# Patient Record
Sex: FEMALE | Race: AMERICAN INDIAN OR ALASKA NATIVE | NOT HISPANIC OR LATINO | Employment: OTHER | URBAN - METROPOLITAN AREA
[De-identification: names, ages, dates, MRNs, and addresses within clinical notes are randomized per-mention and may not be internally consistent; named-entity substitution may affect disease eponyms.]

---

## 2017-01-03 ENCOUNTER — OFFICE VISIT (OUTPATIENT)
Dept: NEUROLOGY | Facility: CLINIC | Age: 62
End: 2017-01-03

## 2017-01-03 VITALS — SYSTOLIC BLOOD PRESSURE: 151 MMHG | DIASTOLIC BLOOD PRESSURE: 70 MMHG | HEART RATE: 78 BPM

## 2017-01-03 DIAGNOSIS — G40.219 PARTIAL SYMPTOMATIC EPILEPSY WITH COMPLEX PARTIAL SEIZURES, INTRACTABLE, WITHOUT STATUS EPILEPTICUS (H): ICD-10-CM

## 2017-01-03 DIAGNOSIS — G40.219 LOCALIZATION-RELATED (FOCAL) (PARTIAL) SYMPTOMATIC EPILEPSY AND EPILEPTIC SYNDROMES WITH COMPLEX PARTIAL SEIZURES, INTRACTABLE, WITHOUT STATUS EPILEPTICUS (H): Primary | ICD-10-CM

## 2017-01-03 RX ORDER — CARBAMAZEPINE 200 MG/1
CAPSULE, EXTENDED RELEASE ORAL
Qty: 120 CAPSULE | Refills: 11 | Status: SHIPPED | OUTPATIENT
Start: 2017-01-03 | End: 2017-01-06

## 2017-01-03 NOTE — PROGRESS NOTES
[facility] Epilepsy [facility] Progress Note      Patient:  Zahra Benjamin  :  1955   Age:  61 year old   Today's Office Visit:  1/3/2017    Epilepsy Data:    Seizure Record  Current Visit Date: 17  Previous Visit Date: 11/10/16  Months since last visit: 1.77  Seizure Type 1: Simple partial onset followed by impairment of consciousness  Description of Sz Type 1: stare with unilat automatims and stiffening    History of Present Illness:     Unfortunately family cannot tell me how often seizures are occurring. Patient does not know when seizures occur either.  Ms Benjamin has multiple caregivers and they often change. Many are not familiar with epilepsy.  Both sisters see seizures on occasion. They describe repetitive verbalizations, staring. They confirm that she is amnestic during seizure.  Seizure duration one to two minutes. No convulsions. Both sisters agree that seizures more frequent than in the past.    Current Outpatient Prescriptions   Medication Sig Dispense Refill     ROSUVASTATIN CALCIUM PO Take 10 mg by mouth daily       VIMPAT 100 MG TABS tablet TAKE 1 TABLET (100 MG) BY MOUTH EVERY MORNING & TAKE 1 TABLET (100 MG) AT 6PM (ALSO TAKE 150MG TABLET AT BEDTIME ) 60 tablet 5     VIMPAT 150 MG TABS tablet TAKE 1 TABLET (VIMPAT 150 MG) BY MOUTH EVERYDAY AT BEDTIME (TAKE VIMPAT 100MG EVERY MORNING AND EVERY NIGHT AT 6 PM) 30 tablet 0     PHENobarbital (LUMINAL) 64.8 MG TABS Take 1 tablet by mouth at bedtime 30 tablet 3     APAP-CODEINE & DIET MANAGE NY PO Every 8 hours PRN       Ferrous Sulfate Dried (SLOW IRON PO) Take 50 mg by mouth daily       acetaminophen (TYLENOL) 500 MG tablet Take 2 tablets by mouth every 8 hours. 100 tablet      multivitamin, therapeutic with minerals (THERA-VIT-M) TABS Take 1 tablet by mouth daily.       olanzapine (ZYPREXA) 2.5 MG tablet Take 0.5 tablets by mouth 2 times daily. 30 tablet 0     PANtoprazole (PROTONIX) 40 MG enteric coated tablet Take 1 tablet by mouth 2 times daily.  60 tablet 0     ondansetron (ZOFRAN) 4 MG tablet Take 4-8 mg by mouth every 8 hours as needed.       magnesium oxide (MAG-OX) 400 MG tablet Take 400 mg by mouth At Bedtime.       calcium carbonate 750 MG CHEW Take 1,500 mg by mouth daily.       docusate sodium (COLACE) 100 MG capsule Take 100 mg by mouth 2 times daily.       Cyanocobalamin (VITAMIN B-12) 2500 MCG tablet Place 2,500 mcg under the tongue daily.       STATIN NOT PRESCRIBED, INTENTIONAL, by Other route continuous prn.  0     CALCIUM 600 + D 600-200 MG-UNIT OR TABS 2 times daily  3     ORDER FOR DME knee 1 0        Perceived AED Side Effects:  No    Medication Notes:  No side effects. Sisters again confirm she is less depressed off phenobarbital.      AED Medication Compliance:  compliant all of the time  Using a pill box:  Yes    Review of Systems:  Lethargy / Tiredness:  No  Nausea / Vomiting:  No  Double Vision:  No  Sleepiness:  No  Depression:  No  Slowed Cognitive Function:  Yes  Memory Problems:  Yes  Poor Balance:  No  Dizziness:  No  Appetite Changes:  No  Blurred Vision:  No  Decreased Libido:  na  Sleep Changes:  No  Behavioral Changes:  No  Skin: negative  Respiratory: No shortness of breath, dyspnea on exertion, cough, or hemoptysis and No shortness of breath  Cardiovascular: negative  Have you experienced a traumatic fall since your last visit: NO; wheel chair bound.  Are these falls related to your seizures:  NO      Other Issues:  No new medical issues.  Is patient safe to drive:  No    Woman Care:   Patient is:  Postmenopausal.    Exam:    /70 mmHg  Pulse 78  Breastfeeding? No     Wt Readings from Last 5 Encounters:   12/03/11 147 lb 0.2 oz (66.683 kg)   10/08/09 159 lb (72.122 kg)   10/17/06 135 lb (61.236 kg)   08/29/06 135 lb (61.236 kg)   06/22/06 135 lb (61.236 kg)     Little irritabilitiy; does not appear overtly depressed. Overall more cooperative today. Language fluent, follows commands, appropriate. In wheel chair with  bilateral leg amputations so cannot evaluate gait.   Visual fields full. Extraocular movements intact without nystagmus. Smile symmetrical. Facial sensation equal. Tongue midline and strong. No drift, pronation, or tremor. Reflexes difficult to obtain in UE, AKA left, BKA right. Finger finger nose is done well.    Heart exam without murmur, RRR. I do not hear carotid bruit. She has not been weighed since multiple amputations but is estimated to weigh approx 150 pounds today.    Ambulatory EEG x 48 hours reviewed. Left temporal slowing. Left temporal epileptiform activity. I cannot find results of imaging in EMR tho she reports she had CNS imaging in the past. Results not immediately available.    Results for MARIAM GUTIERREZ (MRN [id number]) as of 1/3/2017 17:51   Ref. Range 11/7/2016 15:03   Lacosamide Latest Ref Range: >15.0 mcg/mL 10.0   Phenobarbital Free Level Latest Ref Range: 7.5-20 mcg/mL 8.8   Phenobarbital Level Latest Ref Range: 10-30 mcg/mL 14       Assessment and Plan:   1) Partial seizures, left temporal onset. As we had suspected seizures are occurring daily. Appears to have had better seizure control in the past on PHT PB CBZ.   Reduction of PB improved depression and increased responsiveness so we do not want to increase this medication. We had urged further escalation AED doses last two visit but she had refused.    2) Severe vitamin D deficiency; patient refuses MVI or Vit D supplementation.  3) History of significant peripheral vascular disease. Status post left CEA. No evidence of TIA.  4) Probably continues with at least dysthymia. Not major depression and not suicidal. Better than previously, probably because on less phenobarbital. Vitamin D deficiency may be playing a role.    DISCUSSION  We again discussed potential risks of poorly controlled partial epilepsy. She appeared to respond to results of more objective measures and is open to further AED adjustment. Given the reasonable lacosamide  levels she is open to other AEDs.  Review of chart indicates she did well on carbamazepine PHT PB and that only carbamazepine was therapeutic when taking 1000 mg per day. So adding carbamazepine and moving to eventually discontinue either lacosamide or PB is reasonable.  She clearly tolerated that medication well. She is open to this. We also discussed obtaining better seizure count which will be challenging given multiple caregivers who are not aware of her seizure appearance.    PLAN:  1) Same phenobarbital. Provided with regimen to gradually add carbamazepine ER pushing to 800 mg per day while reducing lacosamide to 250 mg per day.  2) We again suggested Vit D supplementation and patient again aggressively resisted.  3) Given seizure diary. Suggested family record one of her seizures on smart phone and use this to educate caregivers on her target spells.  4) Substituting CBZ, OXC, or LTG for PB could be considered if clinically indicated.  5) RTC 4 months.  6) Will try to track down imaging in old paper chart. If we are not making progress may need to reimage especially given persistent left temporal slowing and epileptiform activity.    Karthik Curtis

## 2017-01-03 NOTE — MR AVS SNAPSHOT
After Visit Summary   1/3/2017    Zahra Benjamin    MRN: [id number]           Patient Information     Date Of Birth          1955        Visit Information        Provider Department      1/3/2017 2:00 PM [facility]        Today's Diagnoses     Localization-related (focal) (partial) symptomatic epilepsy and epileptic syndromes with complex partial seizures, intractable, without status epilepticus (H)    -  1       Care Instructions      Times of Days  AM  PM  BED     Medication Tablet Size Number of Tablets/Capsules Total Daily Dosage    NOW vimpat 100 mg  1    1      350 mg               vimpat 150 mg          1                        week 1 to 2 carbamazepine 200 mg   0    0    1  200 mg    vimpat 100 mg  1    1    0      vimpat  150 mg  0    0    1  350 mg                     Week three  carbmazepine 200 mg 1  0 1  400mg   vimpat 100 mg 1  1 0     vimpat 150 mg 0  0 1  350 mg             Week four carbamazepine 200 mg 1  1 1  600 mg   vimpat 100mg 1  1 0     vimpat  150 mg 0  0 1  350 mg             Week five and after carbamazepine 200 mg 1  1 2  800 mg   vimpat 100 mg 1  0 0     vimpat 150 mg 0  0 1  250 mg                                   Keep phenobarbital the same. Start and increase carbamazepine (generic for tegretol).  Once reach 800 mg of carbamazepine reduce vimpat a little bit.        Carry this with you at all times.  CONTINUE TAKING YOUR OTHER MEDICATIONS AS PREVIOUSLY DIRECTED.      * * *Do not store medications in the bathroom.  Keep medications away from children!* * *           Follow-ups after your visit        Follow-up notes from your care team     Return in about 4 months (around 5/3/2017).      Your next 10 appointments already scheduled     May 09, 2017 11:00 AM   Return Visit with MD [facility] ([facility])    [address], [address]  [facility] [address] [phone]               Who to contact     Please call your clinic at [phone] to:    Ask questions about your health    Make or cancel appointments    Discuss your medicines    Learn about your test results    Speak to your doctor   If you have compliments or concerns about an experience at your clinic, or if you wish to file a complaint, please contact [facility] Patient Relations at [phone] or email us at [email or web address]         Additional Information About Your Visit        [employer] Information     Dely is an electronic gateway that provides easy, online access to your medical records. With Dely, you can request a clinic appointment, read your test results, renew a prescription or communicate with your care team.     To sign up for Dely visit the website at [email or web address]   You will be asked to enter the access code listed below, as well as some personal information. Please follow the directions to create your username and password.     Your access code is: [email or web address]  Expires: 2017 11:41 AM     Your access code will  in 90 days. If you need help or a new code, please contact your [facility] or call [phone] for assistance.        Care EveryWhere ID     This is your Care EveryWhere ID. This could be used by other organizations to access your [place] medical records  [id number]        Your Vitals Were     Pulse Breastfeeding?                78 No           Blood Pressure from Last 3 Encounters:   17 151/70   11/10/16 86/64   06/10/15 140/72    Weight from Last 3 Encounters:   11 147 lb 0.2 oz (66.683 kg)   10/08/09 159 lb (72.122 kg)   10/17/06 135 lb (61.236 kg)              Today, you had the following     No orders found for display         Today's Medication Changes          These changes are accurate as of: 1/3/17  3:05 PM.  If you have any questions, ask your nurse or doctor.                Start taking these medicines.        Dose/Directions    carBAMazepine 200 MG 12 hr capsule   Commonly known as:  CARBATROL   Used for:  Localization-related (focal) (partial) symptomatic epilepsy and epileptic syndromes with complex partial seizures, intractable, without status epilepticus (H)   Started by:  Karthik Curtis MD        Increase as instructed until taking one in AM, one in PM and two at bedtime (total 800 mg per day)   Quantity:  120 capsule   Refills:  11            Where to get your medicines      These medications were [place] [facility] [place], [place] - [address]  [address], [facility] [address]     Phone:  [phone]    - carBAMazepine 200 MG 12 hr capsule             Primary Care Provider Office Phone # Fax #    Babatunde Molina PA-C [phone]       [facility] [address]  [facility] [address]        Thank you!     Thank you for choosing [facility]  for your care. Our goal is always to provide you with excellent care. Hearing back from our patients is one way we can continue to improve our services. Please take a few minutes to complete the written survey that you may receive in the mail after your visit with us. Thank you!             Your Updated Medication List - Protect others around you: Learn how to safely use, store and throw away your medicines at www.disposemymeds.org.          This list is accurate as of: 1/3/17  3:05 PM.  Always use your most recent med list.                   Brand Name Dispense Instructions for use    acetaminophen 500 MG tablet    TYLENOL    100 tablet    Take 2 tablets by mouth every 8 hours.       APAP-CODEINE & DIET MANAGE VA PO      Every 8 hours PRN       CALCIUM 600 + D 600-200 MG-UNIT Tabs      2 times daily       calcium carbonate 750 MG Chew      Take 1,500 mg by mouth daily.       carBAMazepine 200 MG 12 hr capsule    CARBATROL    120 capsule    Increase as instructed  until taking one in AM, one in PM and two at bedtime (total 800 mg per day)       cyabnocobalamin 2500 MCG sublingual tablet    VITAMIN B-12     Place 2,500 mcg under the tongue daily.       docusate sodium 100 MG capsule    COLACE     Take 100 mg by mouth 2 times daily.       magnesium oxide 400 MG tablet    MAG-OX     Take 400 mg by mouth At Bedtime.       multivitamin, therapeutic with minerals Tabs tablet      Take 1 tablet by mouth daily.       OLANZapine 2.5 MG tablet    zyPREXA    30 tablet    Take 0.5 tablets by mouth 2 times daily.       * order for DME     1    knee       pantoprazole 40 MG EC tablet    PROTONIX    60 tablet    Take 1 tablet by mouth 2 times daily.       PHENobarbital 64.8 MG Tabs tablet    LUMINAL    30 tablet    Take 1 tablet by mouth at bedtime       ROSUVASTATIN CALCIUM PO      Take 10 mg by mouth daily       SLOW IRON PO      Take 50 mg by mouth daily       * STATIN NOT PRESCRIBED (INTENTIONAL)      by Other route continuous prn.       * VIMPAT 100 MG Tabs tablet   Generic drug:  Lacosamide     60 tablet    TAKE 1 TABLET (100 MG) BY MOUTH EVERY MORNING & TAKE 1 TABLET (100 MG) AT 6PM (ALSO TAKE 150MG TABLET AT BEDTIME )       * VIMPAT 150 MG Tabs tablet   Generic drug:  lacosamide     30 tablet    TAKE 1 TABLET (VIMPAT 150 MG) BY MOUTH EVERYDAY AT BEDTIME (TAKE VIMPAT 100MG EVERY MORNING AND EVERY NIGHT AT 6 PM)       ZOFRAN 4 MG tablet   Generic drug:  ondansetron      Take 4-8 mg by mouth every 8 hours as needed.       * Notice:  This list has 4 medication(s) that are the same as other medications prescribed for you. Read the directions carefully, and ask your doctor or other care provider to review them with you.

## 2017-01-03 NOTE — Clinical Note
1/3/2017       RE: Zahra Benjamin  : 1955   MRN: [id number]      Dear Colleague,    Thank you for referring your patient, Zahra Benjamin, to the [facility]. Please see a copy of my visit note below.    [facility]      Patient:  Zahra Benjamin  :  1955   Age:  61 year old   Today's Office Visit:  1/3/2017    Epilepsy Data:    Seizure Record  Current Visit Date: 17  Previous Visit Date: 11/10/16  Months since last visit: 1.77  Seizure Type 1: Simple partial onset followed by impairment of consciousness  Description of Sz Type 1: stare with unilat automatims and stiffening    History of Present Illness:     Unfortunately family cannot tell me how often seizures are occurring. Patient does not know when seizures occur either.  Ms Benjamin has multiple caregivers and they often change. Many are not familiar with epilepsy.  Both sisters see seizures on occasion. They describe repetitive verbalizations, staring. They confirm that she is amnestic during seizure.  Seizure duration one to two minutes. No convulsions. Both sisters agree that seizures more frequent than in the past.    Current Outpatient Prescriptions   Medication Sig Dispense Refill     ROSUVASTATIN CALCIUM PO Take 10 mg by mouth daily       VIMPAT 100 MG TABS tablet TAKE 1 TABLET (100 MG) BY MOUTH EVERY MORNING & TAKE 1 TABLET (100 MG) AT 6PM (ALSO TAKE 150MG TABLET AT BEDTIME ) 60 tablet 5     VIMPAT 150 MG TABS tablet TAKE 1 TABLET (VIMPAT 150 MG) BY MOUTH EVERYDAY AT BEDTIME (TAKE VIMPAT 100MG EVERY MORNING AND EVERY NIGHT AT 6 PM) 30 tablet 0     PHENobarbital (LUMINAL) 64.8 MG TABS Take 1 tablet by mouth at bedtime 30 tablet 3     APAP-CODEINE & DIET MANAGE [place] PO Every 8 hours PRN       Ferrous Sulfate Dried (SLOW IRON PO) Take 50 mg by mouth daily       acetaminophen (TYLENOL) 500 MG tablet Take 2 tablets by mouth every 8 hours. 100 tablet       multivitamin, therapeutic with minerals (THERA-VIT-M) TABS Take 1 tablet by mouth daily.       olanzapine (ZYPREXA) 2.5 MG tablet Take 0.5 tablets by mouth 2 times daily. 30 tablet 0     PANtoprazole (PROTONIX) 40 MG enteric coated tablet Take 1 tablet by mouth 2 times daily. 60 tablet 0     ondansetron (ZOFRAN) 4 MG tablet Take 4-8 mg by mouth every 8 hours as needed.       magnesium oxide (MAG-OX) 400 MG tablet Take 400 mg by mouth At Bedtime.       calcium carbonate 750 MG CHEW Take 1,500 mg by mouth daily.       docusate sodium (COLACE) 100 MG capsule Take 100 mg by mouth 2 times daily.       Cyanocobalamin (VITAMIN B-12) 2500 MCG tablet Place 2,500 mcg under the tongue daily.       STATIN NOT PRESCRIBED, INTENTIONAL, by Other route continuous prn.  0     CALCIUM 600 + D 600-200 MG-UNIT OR TABS 2 times daily  3     ORDER FOR DME knee 1 0        Perceived AED Side Effects:  No    Medication Notes:  No side effects. Sisters again confirm she is less depressed off phenobarbital.      AED Medication Compliance:  compliant all of the time  Using a pill box:  Yes    Review of Systems:  Lethargy / Tiredness:  No  Nausea / Vomiting:  No  Double Vision:  No  Sleepiness:  No  Depression:  No  Slowed Cognitive Function:  Yes  Memory Problems:  Yes  Poor Balance:  No  Dizziness:  No  Appetite Changes:  No  Blurred Vision:  No  Decreased Libido:  na  Sleep Changes:  No  Behavioral Changes:  No  Skin: negative  Respiratory: No shortness of breath, dyspnea on exertion, cough, or hemoptysis and No shortness of breath  Cardiovascular: negative  Have you experienced a traumatic fall since your last visit: NO; wheel chair bound.  Are these falls related to your seizures:  NO      Other Issues:  No new medical issues.  Is patient safe to drive:  No    Woman Care:   Patient is:  Postmenopausal.    Exam:    /70 mmHg  Pulse 78  Breastfeeding? No     Wt Readings from Last 5 Encounters:   12/03/11 147 lb 0.2 oz (66.683 kg)    10/08/09 159 lb (72.122 kg)   10/17/06 135 lb (61.236 kg)   08/29/06 135 lb (61.236 kg)   06/22/06 135 lb (61.236 kg)     Little irritabilitiy; does not appear overtly depressed. Overall more cooperative today. Language fluent, follows commands, appropriate. In wheel chair with bilateral leg amputations so cannot evaluate gait.   Visual fields full. Extraocular movements intact without nystagmus. Smile symmetrical. Facial sensation equal. Tongue midline and strong. No drift, pronation, or tremor. Reflexes difficult to obtain in UE, AKA left, BKA right. Finger finger nose is done well.    Heart exam without murmur, RRR. I do not hear carotid bruit. She has not been weighed since multiple amputations but is estimated to weigh approx 150 pounds today.    Ambulatory EEG x 48 hours reviewed. Left temporal slowing. Left temporal epileptiform activity. I cannot find results of imaging in EMR tho she reports she had CNS imaging in the past. Results not immediately available.    Results for MARIAM GUTIERREZ (MRN [id number]) as of 1/3/2017 17:51   Ref. Range 11/7/2016 15:03   Lacosamide Latest Ref Range: >15.0 mcg/mL 10.0   Phenobarbital Free Level Latest Ref Range: 7.5-20 mcg/mL 8.8   Phenobarbital Level Latest Ref Range: 10-30 mcg/mL 14       Assessment and Plan:   1) Partial seizures, left temporal onset. As we had suspected seizures are occurring daily. Appears to have had better seizure control in the past on PHT PB CBZ.   Reduction of PB improved depression and increased responsiveness so we do not want to increase this medication. We had urged further escalation AED doses last two visit but she had refused.    2) Severe vitamin D deficiency; patient refuses MVI or Vit D supplementation.  3) History of significant peripheral vascular disease. Status post left CEA. No evidence of TIA.  4) Probably continues with at least dysthymia. Not major depression and not suicidal. Better than previously, probably because on less  phenobarbital. Vitamin D deficiency may be playing a role.    DISCUSSION  We again discussed potential risks of poorly controlled partial epilepsy. She appeared to respond to results of more objective measures and is open to further AED adjustment. Given the reasonable lacosamide levels she is open to other AEDs.  Review of chart indicates she did well on carbamazepine PHT PB and that only carbamazepine was therapeutic when taking 1000 mg per day. So adding carbamazepine and moving to eventually discontinue either lacosamide or PB is reasonable.  She clearly tolerated that medication well. She is open to this. We also discussed obtaining better seizure count which will be challenging given multiple caregivers who are not aware of her seizure appearance.    PLAN:  1) Same phenobarbital. Provided with regimen to gradually add carbamazepine ER pushing to 800 mg per day while reducing lacosamide to 250 mg per day.  2) We again suggested Vit D supplementation and patient again aggressively resisted.  3) Given seizure diary. Suggested family record one of her seizures on smart phone and use this to educate caregivers on her target spells.  4) Substituting CBZ, OXC, or LTG for PB could be considered if clinically indicated.  5) RTC 4 months.  6) Will try to track down imaging in old paper chart. If we are not making progress may need to reimage especially given persistent left temporal slowing and epileptiform activity.      Again, thank you for allowing me to participate in the care of your patient.      Sincerely,    Karthik Curtis MD

## 2017-01-03 NOTE — PATIENT INSTRUCTIONS
Times of Days  AM  PM  BED     Medication Tablet Size Number of Tablets/Capsules Total Daily Dosage    NOW vimpat 100 mg  1    1      350 mg               vimpat 150 mg          1                        week 1 to 2 carbamazepine 200 mg   0    0    1  200 mg    vimpat 100 mg  1    1    0      vimpat  150 mg  0    0    1  350 mg                     Week three  carbmazepine 200 mg 1  0 1  400mg   vimpat 100 mg 1  1 0     vimpat 150 mg 0  0 1  350 mg             Week four carbamazepine 200 mg 1  1 1  600 mg   vimpat 100mg 1  1 0     vimpat  150 mg 0  0 1  350 mg             Week five and after carbamazepine 200 mg 1  1 2  800 mg   vimpat 100 mg 1  0 0     vimpat 150 mg 0  0 1  250 mg                                   Keep phenobarbital the same. Start and increase carbamazepine (generic for tegretol).  Once reach 800 mg of carbamazepine reduce vimpat a little bit.        Carry this with you at all times.  CONTINUE TAKING YOUR OTHER MEDICATIONS AS PREVIOUSLY DIRECTED.      * * *Do not store medications in the bathroom.  Keep medications away from children!* * *

## 2017-01-06 ENCOUNTER — TELEPHONE (OUTPATIENT)
Dept: NEUROLOGY | Facility: CLINIC | Age: 62
End: 2017-01-06

## 2017-01-06 DIAGNOSIS — G40.219 LOCALIZATION-RELATED (FOCAL) (PARTIAL) SYMPTOMATIC EPILEPSY AND EPILEPTIC SYNDROMES WITH COMPLEX PARTIAL SEIZURES, INTRACTABLE, WITHOUT STATUS EPILEPTICUS (H): Primary | ICD-10-CM

## 2017-01-06 RX ORDER — CARBAMAZEPINE 200 MG/1
TABLET, EXTENDED RELEASE ORAL
Qty: 120 TABLET | Refills: 11 | Status: SHIPPED | OUTPATIENT
Start: 2017-01-06 | End: 2017-11-09

## 2017-01-07 DIAGNOSIS — G40.219 LOCALIZATION-RELATED SYMPTOMATIC EPILEPSY AND EPILEPTIC SYNDROMES WITH COMPLEX PARTIAL SEIZURES, INTRACTABLE, WITHOUT STATUS EPILEPTICUS (H): Primary | ICD-10-CM

## 2017-01-09 RX ORDER — LACOSAMIDE 150 MG/1
TABLET ORAL
Qty: 30 TABLET | Refills: 5 | Status: SHIPPED | OUTPATIENT
Start: 2017-01-09 | End: 2017-07-05

## 2017-03-11 DIAGNOSIS — G40.109 SYMPTOMATIC LOCALIZATION-RELATED EPILEPSY (H): ICD-10-CM

## 2017-03-11 DIAGNOSIS — G40.209 LOCALIZATION-RELATED PARTIAL EPILEPSY WITH COMPLEX PARTIAL SEIZURES (H): ICD-10-CM

## 2017-03-13 RX ORDER — PHENOBARBITAL 64.8 MG/1
TABLET ORAL
Qty: 30 TABLET | Refills: 5 | Status: SHIPPED | OUTPATIENT
Start: 2017-03-13 | End: 2017-08-26

## 2017-03-13 RX ORDER — LACOSAMIDE 100 MG/1
TABLET ORAL
Qty: 30 TABLET | Refills: 3 | Status: SHIPPED | OUTPATIENT
Start: 2017-03-13 | End: 2017-07-05

## 2017-05-09 ENCOUNTER — TRANSFERRED RECORDS (OUTPATIENT)
Dept: HEALTH INFORMATION MANAGEMENT | Facility: CLINIC | Age: 62
End: 2017-05-09

## 2017-05-09 ENCOUNTER — OFFICE VISIT (OUTPATIENT)
Dept: NEUROLOGY | Facility: CLINIC | Age: 62
End: 2017-05-09

## 2017-05-09 VITALS — DIASTOLIC BLOOD PRESSURE: 61 MMHG | HEART RATE: 68 BPM | SYSTOLIC BLOOD PRESSURE: 135 MMHG

## 2017-05-09 DIAGNOSIS — G40.219 LOCALIZATION-RELATED SYMPTOMATIC EPILEPSY AND EPILEPTIC SYNDROMES WITH COMPLEX PARTIAL SEIZURES, INTRACTABLE, WITHOUT STATUS EPILEPTICUS (H): Primary | ICD-10-CM

## 2017-05-09 DIAGNOSIS — G40.219 PARTIAL SYMPTOMATIC EPILEPSY WITH COMPLEX PARTIAL SEIZURES, INTRACTABLE, WITHOUT STATUS EPILEPTICUS (H): ICD-10-CM

## 2017-05-09 NOTE — LETTER
2017       RE: Zahra Benjamin  : 1955   MRN: [id number]      Dear Colleague,    Thank you for referring your patient, Zahra Benjamin, to the [facility] EPILEPSY CARE at [facility]. Please see a copy of my visit note below.    [facility]psy [facility] Progress Note      Patient:  Zahra Benjamin  :  1955   Age:  61 year old   Today's Office Visit:  2017    Epilepsy Data:               Seizure Record  Current Visit Date: 17  Previous Visit Date: 17  Months since last visit: 4.14  Seizure Type 1: Simple partial onset followed by impairment of consciousness  Description of Sz Type 1: stare with unilat automatims and stiffening  # of Type 1 Seizure since last visit: 8  Freq. Type 1 / Month: 1.93      History of Present Illness:     Last visit we had documented daily complex partial seizures with ambulatory EEG. This information persuaded her to allow us make AED changes so we initiated carbamazepine.     Got new agency with consistent caregiver. Better records are available.  Shortly after she saw us new caregiver documented having seizures three to four times per week.   Following addition of carbamazepine seizures improved to about one to three per month.  Over last three months averaging 2 seizures per month. Daughter has not seen seizures since carbamazepine reached target dose, prior to that daughter saw seizures regularly.    Current Outpatient Prescriptions   Medication Sig Dispense Refill     NONFORMULARY CBD Oil       Lacosamide (VIMPAT) 100 MG TABS tablet Take 1 tab (100 mg) po every morning and ( 150 mg tab at hs as before) 30 tablet 3     PHENobarbital (LUMINAL) 64.8 MG TABS tablet Take 1 tablet by mouth at bedtime 30 tablet 5     lacosamide (VIMPAT) 150 MG TABS tablet Take 1 tab po hs. (take the Vimpat 100 mg tab as directed) 30 tablet 5     carBAMazepine (TEGRETOL XR) 200 MG 12 hr tablet Increase as directed to 1 tab po am - 1 tab po pm - 2 tab  po pm 120 tablet 11     ROSUVASTATIN CALCIUM PO Take 10 mg by mouth daily       Ferrous Sulfate Dried (SLOW IRON PO) Take 50 mg by mouth daily       acetaminophen (TYLENOL) 500 MG tablet Take 2 tablets by mouth every 8 hours. 100 tablet      multivitamin, therapeutic with minerals (THERA-VIT-M) TABS Take 1 tablet by mouth daily.       olanzapine (ZYPREXA) 2.5 MG tablet Take 0.5 tablets by mouth 2 times daily. 30 tablet 0     PANtoprazole (PROTONIX) 40 MG enteric coated tablet Take 1 tablet by mouth 2 times daily. 60 tablet 0     magnesium oxide (MAG-OX) 400 MG tablet Take 400 mg by mouth At Bedtime.       calcium carbonate 750 MG CHEW Take 1,500 mg by mouth daily.       docusate sodium (COLACE) 100 MG capsule Take 100 mg by mouth 2 times daily.       Cyanocobalamin (VITAMIN B-12) 2500 MCG tablet Place 2,500 mcg under the tongue daily.       CALCIUM 600 + D 600-200 MG-UNIT OR TABS 2 times daily  3     STATIN NOT PRESCRIBED, INTENTIONAL, by Other route continuous prn.  0     ORDER FOR DME knee 1 0        Perceived AED Side Effects:  No    Medication Notes:  No side effecs.  AED Side Effects Discussed: Yes   AED Medication Compliance:  compliant all of the time  Using a pill box:  Medications are administered to patient.    Review of Systems:  Lethargy / Tiredness:  No  Nausea / Vomiting:  No  Double Vision:  No  Sleepiness:  No  Depression:  No  Slowed Cognitive Function:  No  Memory Problems:  Yes  Poor Balance:  Wheel chair bound  Dizziness:  No  Appetite Changes:  No  Blurred Vision:  No  Decreased Libido:  na  Sleep Changes:  No  Behavioral Changes:  No  Skin: negative  Respiratory: No shortness of breath, dyspnea on exertion, cough, or hemoptysis  Cardiovascular: negative  Have you experienced a traumatic fall since your last visit: wheel chair bound  Are these falls related to your seizures:  Not Applicable      Other Issues:    No other health troubles. Denies angina. Denies symptoms suggesting TIA including  amaurosis fugax.  Is patient safe to drive:  No    Woman Care:   Patient is:  Postmenopausal.    Exam:    /61 (BP Location: Left arm, Patient Position: Chair, Cuff Size: Adult Regular)  Pulse 68  Breastfeeding? No     Wt Readings from Last 5 Encounters:   12/03/11 147 lb 0.2 oz (66.7 kg)   10/08/09 159 lb (72.1 kg)   10/17/06 135 lb (61.2 kg)   08/29/06 135 lb (61.2 kg)   06/22/06 135 lb (61.2 kg)     Little irritabilitiy; does not appear overtly depressed. Cooperative; much less bitter than several years ago. Language fluent, follows commands, appropriate. In wheel chair with bilateral leg amputations so cannot evaluate gait. Visual fields full. Extraocular movements intact without nystagmus. Smile symmetrical. Facial sensation equal. Tongue midline and strong. No drift, pronation, or tremor. Reflexes difficult to obtain in UE, AKA left, BKA right. Finger finger nose is done well.     Heart exam without murmur, RRR. I do not hear carotid bruit. She has not been weighed since multiple amputations but is estimated to weigh approx 150 pounds today.      Assessment and Plan:   1) Partial seizures, left temporal onset. Seizures appear to have improved significantly following addition of carbamazepine. Still not certain we are detecting all seizures but between consistent caregiver and family observations feel pretty confident we have made progress.    2) Severe vitamin D deficiency; patient refuses MVI or Vit D supplementation.  3) History of significant peripheral vascular disease. Status post left CEA. No evidence of TIA.  4) Probably continues with at least dysthymia. Not major depression and not suicidal. Better than previously, probably because on less phenobarbital. Vitamin D deficiency may be playing a role.     DISCUSSION  Discussed how aggressive we should be given the fact she is wheel chair bound and not having convulsions. She does not want more aggressive treatment at this time and family seconds  this.     PLAN:  1) Same phenobarbital, carbamazepine and lacosamide. AED levels today to confirm compliance, rule out toxicity. Sodium to follow up side effects.  2) We again suggested Vit D supplementation and patient again aggressively resisted.  3) Encouraged continued use of seizure diary. They have done a good job since last visit.  4) Substituting CBZ, OXC, or LTG for PB could be considered if clinically indicated.  5) RTC 4 months.  6) Will try to track down imaging in old paper chart. If we are not making progress may need to reimage especially given persistent left temporal slowing and epileptiform activity.     Again, thank you for allowing me to participate in the care of your patient.      Sincerely,    Karthik Curtis MD

## 2017-05-09 NOTE — PROGRESS NOTES
[facility]      Patient:  Zahra Benjamin  :  1955   Age:  61 year old   Today's Office Visit:  2017    Epilepsy Data:               Seizure Record  Current Visit Date: 17  Previous Visit Date: 17  Months since last visit: 4.14  Seizure Type 1: Simple partial onset followed by impairment of consciousness  Description of Sz Type 1: stare with unilat automatims and stiffening  # of Type 1 Seizure since last visit: 8  Freq. Type 1 / Month: 1.93      History of Present Illness:     Last visit we had documented daily complex partial seizures with ambulatory EEG. This information persuaded her to allow us make AED changes so we initiated carbamazepine.     Got new agency with consistent caregiver. Better records are available.  Shortly after she saw us new caregiver documented having seizures three to four times per week.   Following addition of carbamazepine seizures improved to about one to three per month.  Over last three months averaging 2 seizures per month. Daughter has not seen seizures since carbamazepine reached target dose, prior to that daughter saw seizures regularly.    Current Outpatient Prescriptions   Medication Sig Dispense Refill     NONFORMULARY CBD Oil       Lacosamide (VIMPAT) 100 MG TABS tablet Take 1 tab (100 mg) po every morning and ( 150 mg tab at hs as before) 30 tablet 3     PHENobarbital (LUMINAL) 64.8 MG TABS tablet Take 1 tablet by mouth at bedtime 30 tablet 5     lacosamide (VIMPAT) 150 MG TABS tablet Take 1 tab po hs. (take the Vimpat 100 mg tab as directed) 30 tablet 5     carBAMazepine (TEGRETOL XR) 200 MG 12 hr tablet Increase as directed to 1 tab po am - 1 tab po pm - 2 tab po pm 120 tablet 11     ROSUVASTATIN CALCIUM PO Take 10 mg by mouth daily       Ferrous Sulfate Dried (SLOW IRON PO) Take 50 mg by mouth daily       acetaminophen (TYLENOL) 500 MG tablet Take 2 tablets by mouth every 8 hours. 100 tablet      multivitamin, therapeutic  with minerals (THERA-VIT-M) TABS Take 1 tablet by mouth daily.       olanzapine (ZYPREXA) 2.5 MG tablet Take 0.5 tablets by mouth 2 times daily. 30 tablet 0     PANtoprazole (PROTONIX) 40 MG enteric coated tablet Take 1 tablet by mouth 2 times daily. 60 tablet 0     magnesium oxide (MAG-OX) 400 MG tablet Take 400 mg by mouth At Bedtime.       calcium carbonate 750 MG CHEW Take 1,500 mg by mouth daily.       docusate sodium (COLACE) 100 MG capsule Take 100 mg by mouth 2 times daily.       Cyanocobalamin (VITAMIN B-12) 2500 MCG tablet Place 2,500 mcg under the tongue daily.       CALCIUM 600 + D 600-200 MG-UNIT OR TABS 2 times daily  3     STATIN NOT PRESCRIBED, INTENTIONAL, by Other route continuous prn.  0     ORDER FOR DME knee 1 0        Perceived AED Side Effects:  No    Medication Notes:  No side effecs.  AED Side Effects Discussed: Yes   AED Medication Compliance:  compliant all of the time  Using a pill box:  Medications are administered to patient.    Review of Systems:  Lethargy / Tiredness:  No  Nausea / Vomiting:  No  Double Vision:  No  Sleepiness:  No  Depression:  No  Slowed Cognitive Function:  No  Memory Problems:  Yes  Poor Balance:  Wheel chair bound  Dizziness:  No  Appetite Changes:  No  Blurred Vision:  No  Decreased Libido:  na  Sleep Changes:  No  Behavioral Changes:  No  Skin: negative  Respiratory: No shortness of breath, dyspnea on exertion, cough, or hemoptysis  Cardiovascular: negative  Have you experienced a traumatic fall since your last visit: wheel chair bound  Are these falls related to your seizures:  Not Applicable      Other Issues:    No other health troubles. Denies angina. Denies symptoms suggesting TIA including amaurosis fugax.  Is patient safe to drive:  No    Woman Care:   Patient is:  Postmenopausal.    Exam:    /61 (BP Location: Left arm, Patient Position: Chair, Cuff Size: Adult Regular)  Pulse 68  Breastfeeding? No     Wt Readings from Last 5 Encounters:    12/03/11 147 lb 0.2 oz (66.7 kg)   10/08/09 159 lb (72.1 kg)   10/17/06 135 lb (61.2 kg)   08/29/06 135 lb (61.2 kg)   06/22/06 135 lb (61.2 kg)     Little irritabilitiy; does not appear overtly depressed. Cooperative; much less bitter than several years ago. Language fluent, follows commands, appropriate. In wheel chair with bilateral leg amputations so cannot evaluate gait. Visual fields full. Extraocular movements intact without nystagmus. Smile symmetrical. Facial sensation equal. Tongue midline and strong. No drift, pronation, or tremor. Reflexes difficult to obtain in UE, AKA left, BKA right. Finger finger nose is done well.     Heart exam without murmur, RRR. I do not hear carotid bruit. She has not been weighed since multiple amputations but is estimated to weigh approx 150 pounds today.      Assessment and Plan:   1) Partial seizures, left temporal onset. Seizures appear to have improved significantly following addition of carbamazepine. Still not certain we are detecting all seizures but between consistent caregiver and family observations feel pretty confident we have made progress.    2) Severe vitamin D deficiency; patient refuses MVI or Vit D supplementation.  3) History of significant peripheral vascular disease. Status post left CEA. No evidence of TIA.  4) Probably continues with at least dysthymia. Not major depression and not suicidal. Better than previously, probably because on less phenobarbital. Vitamin D deficiency may be playing a role.     DISCUSSION  Discussed how aggressive we should be given the fact she is wheel chair bound and not having convulsions. She does not want more aggressive treatment at this time and family seconds this.     PLAN:  1) Same phenobarbital, carbamazepine and lacosamide. AED levels today to confirm compliance, rule out toxicity. Sodium to follow up side effects.  2) We again suggested Vit D supplementation and patient again aggressively resisted.  3) Encouraged  continued use of seizure diary. They have done a good job since last visit.  4) Substituting CBZ, OXC, or LTG for PB could be considered if clinically indicated.  5) RTC 4 months.  6) Will try to track down imaging in old paper chart. If we are not making progress may need to reimage especially given persistent left temporal slowing and epileptiform activity.     Karthik Curtis

## 2017-05-09 NOTE — MR AVS SNAPSHOT
After Visit Summary   5/9/2017    Zahra Benjamin    MRN: [id number]           Patient Information     Date Of Birth          1955        Visit Information        Provider Department      5/9/2017 11:00 AM Karthik Curtis MD [facility] Epilepsy Care        Today's Diagnoses     Localization-related symptomatic epilepsy and epileptic syndromes with complex partial seizures, intractable, without status epilepticus (H)    -  1       Follow-ups after your visit        Follow-up notes from your care team     Return in about 6 months (around 11/9/2017).      Your next 10 appointments already scheduled     Nov 09, 2017  1:30 PM CST   Return Visit with Karthik Curtis MD   [facility] Epilepsy [facility] ([facility])    [address], [address]  [facility] [address] [phone]              Future tests that were ordered for you today     Open Future Orders        Priority Expected Expires Ordered    Carbamazepine total Routine  5/9/2018 5/9/2017    Phenobarbital level Routine  5/9/2018 5/9/2017    Vitamin D Deficiency (Calcifediol) Routine  5/9/2018 5/9/2017    Sodium Routine  5/9/2018 5/9/2017            Who to contact     Please call your clinic at [phone] to:    Ask questions about your health    Make or cancel appointments    Discuss your medicines    Learn about your test results    Speak to your doctor   If you have compliments or concerns about an experience at your clinic, or if you wish to file a complaint, please contact [facility] Patient Relations at [phone] or email us at [email or web address]         Additional Information About Your Visit        MyChart Information     [employer] is an electronic gateway that provides easy, online access to your medical records. With [employer], you can request a clinic appointment, read your test results, renew a prescription or communicate with your care team.     To sign up for  [employer] visit the website at [email or web address]   You will be asked to enter the access code listed below, as well as some personal information. Please follow the directions to create your username and password.     Your access code is: [email or web address]  Expires: 2017 11:48 AM     Your access code will  in 90 days. If you need help or a new code, please contact your [facility] or call [phone] for assistance.        Care EveryWhere ID     This is your Care EveryWhere ID. This could be used by other organizations to access your [facility] medical records  [id number]        Your Vitals Were     Pulse Breastfeeding?                68 No           Blood Pressure from Last 3 Encounters:   17 135/61   17 151/70   11/10/16 (!) 86/64    Weight from Last 3 Encounters:   11 147 lb 0.2 oz (66.7 kg)   10/08/09 159 lb (72.1 kg)   10/17/06 135 lb (61.2 kg)                 Today's Medication Changes          These changes are accurate as of: 17 11:48 AM.  If you have any questions, ask your nurse or doctor.               Stop taking these medicines if you haven't already. Please contact your care team if you have questions.     APAP-CODEINE & DIET MANAGE NH PO   Stopped by:  Karthik Curtis MD           ZOFRAN 4 MG tablet   Generic drug:  ondansetron   Stopped by:  Karthik Curtis MD                    Primary Care Provider Office Phone # Fax #    Babatudne Molina PA-C [phone]       [facility] [address] [facility] [address]        Thank you!     Thank you for choosing [facility] EPILEPSY CARE  for your care. Our goal is always to provide you with excellent care. Hearing back from our patients is one way we can continue to improve our services. Please take a few minutes to complete the written survey that you may receive in the mail after your visit with us. Thank you!             Your Updated Medication  List - Protect others around you: Learn how to safely use, store and throw away your medicines at www.disposemymeds.org.          This list is accurate as of: 5/9/17 11:48 AM.  Always use your most recent med list.                   Brand Name Dispense Instructions for use    acetaminophen 500 MG tablet    TYLENOL    100 tablet    Take 2 tablets by mouth every 8 hours.       CALCIUM 600 + D 600-200 MG-UNIT Tabs      2 times daily       calcium carbonate 750 MG Chew      Take 1,500 mg by mouth daily.       carBAMazepine 200 MG 12 hr tablet    TEGretol XR    120 tablet    Increase as directed to 1 tab po am - 1 tab po pm - 2 tab po pm       cyabnocobalamin 2500 MCG sublingual tablet    VITAMIN B-12     Place 2,500 mcg under the tongue daily.       docusate sodium 100 MG capsule    COLACE     Take 100 mg by mouth 2 times daily.       * lacosamide 150 MG Tabs tablet    VIMPAT    30 tablet    Take 1 tab po hs. (take the Vimpat 100 mg tab as directed)       * Lacosamide 100 MG Tabs tablet    VIMPAT    30 tablet    Take 1 tab (100 mg) po every morning and ( 150 mg tab at hs as before)       magnesium oxide 400 MG tablet    MAG-OX     Take 400 mg by mouth At Bedtime.       multivitamin, therapeutic with minerals Tabs tablet      Take 1 tablet by mouth daily.       NONFORMULARY      CBD Oil       OLANZapine 2.5 MG tablet    zyPREXA    30 tablet    Take 0.5 tablets by mouth 2 times daily.       * order for DME     1    knee       pantoprazole 40 MG EC tablet    PROTONIX    60 tablet    Take 1 tablet by mouth 2 times daily.       PHENobarbital 64.8 MG Tabs tablet    LUMINAL    30 tablet    Take 1 tablet by mouth at bedtime       ROSUVASTATIN CALCIUM PO      Take 10 mg by mouth daily       SLOW IRON PO      Take 50 mg by mouth daily       * STATIN NOT PRESCRIBED (INTENTIONAL)      by Other route continuous prn.       * Notice:  This list has 4 medication(s) that are the same as other medications prescribed for you. Read the  directions carefully, and ask your doctor or other care provider to review them with you.

## 2017-05-10 LAB
% GRANULOCYTES: 79.6 % (ref 42.2–75.2)
ALBUMIN SERPL-MCNC: 3.5 G/DL (ref 3.3–5.5)
ALP SERPL-CCNC: 104 U/L (ref 42–141)
ALT SERPL W/O P-5'-P-CCNC: 19 IU/L (ref 10–47)
AST SERPL-CCNC: 28 IU/L (ref 11–38)
BILIRUB SERPL-MCNC: 0.5 MG/DL (ref 0.2–1.6)
BUN SERPL-MCNC: 10 MG/DL (ref 7–22)
CALCIUM SERPL-MCNC: 9.3 MG/DL (ref 8–10.3)
CARBAMAZEPINE TOTAL: 7.7 MG/L (ref 4–12)
CHLORIDE SERPLBLD-SCNC: 94 MMOL/L (ref 98–108)
CO2 SERPL-SCNC: 28 MMOL/L (ref 18–33)
CREAT SERPL-MCNC: 0.8 MG/DL (ref 0.6–1.2)
ERYTHROCYTE [DISTWIDTH] IN BLOOD BY AUTOMATED COUNT: 16.5 % (ref 11.6–13.7)
GFR SERPL CREATININE-BSD FRML MDRD: >60 ML/MIN/1.7
GLUCOSE SERPL-MCNC: 101 MG/DL (ref 73–118)
HCT VFR BLD AUTO: 38.5 % (ref 35–45)
HEMOGLOBIN: 11.9 G/DL (ref 11.7–15.5)
LYMPHOCYTES NFR BLD AUTO: 17.1 % (ref 20.5–51.1)
MCH RBC QN AUTO: 27.9 PG (ref 27–33)
MCHC RBC AUTO-ENTMCNC: 30.8 G/DL (ref 32–36)
MCV RBC AUTO: 90.5 FL (ref 80–100)
MONOCYTES NFR BLD AUTO: 3.3 % (ref 1.7–9.3)
PHENOBARBITAL: 14.8 MG/L (ref 15–40)
PLATELET # BLD AUTO: 167 K/UL (ref 140–400)
PMV BLD: 8.5 FL (ref 5–20)
POTASSIUM SERPL-SCNC: 5.1 MMOL/L (ref 3.6–5.1)
PROTEIN TOTAL (EXTERNAL): 8.6 G/DL (ref 6.4–8.1)
RBC # BLD AUTO: 4.25 K/UL (ref 3.8–5.1)
SODIUM SERPL-SCNC: 133 MMOL/L (ref 128–145)
VITAMIN D - TOTAL: 24.5 NG/ML (ref 30–100)
WBC # BLD AUTO: 6.3 K/UL (ref 3.8–10.8)

## 2017-07-05 DIAGNOSIS — G40.219 LOCALIZATION-RELATED SYMPTOMATIC EPILEPSY AND EPILEPTIC SYNDROMES WITH COMPLEX PARTIAL SEIZURES, INTRACTABLE, WITHOUT STATUS EPILEPTICUS (H): ICD-10-CM

## 2017-07-05 DIAGNOSIS — G40.209 LOCALIZATION-RELATED PARTIAL EPILEPSY WITH COMPLEX PARTIAL SEIZURES (H): ICD-10-CM

## 2017-07-05 RX ORDER — LACOSAMIDE 150 MG/1
TABLET ORAL
Qty: 30 TABLET | Refills: 5 | Status: SHIPPED | OUTPATIENT
Start: 2017-07-05 | End: 2017-11-09

## 2017-07-05 RX ORDER — LACOSAMIDE 100 MG/1
TABLET, FILM COATED ORAL
Qty: 30 TABLET | Refills: 5 | Status: SHIPPED | OUTPATIENT
Start: 2017-07-05 | End: 2017-11-09

## 2017-08-26 DIAGNOSIS — G40.109 SYMPTOMATIC LOCALIZATION-RELATED EPILEPSY (H): ICD-10-CM

## 2017-08-28 RX ORDER — PHENOBARBITAL 64.8 MG/1
TABLET ORAL
Qty: 30 TABLET | Refills: 5 | Status: SHIPPED | OUTPATIENT
Start: 2017-08-28 | End: 2017-08-29

## 2017-08-29 DIAGNOSIS — G40.109 SYMPTOMATIC LOCALIZATION-RELATED EPILEPSY (H): ICD-10-CM

## 2017-08-30 RX ORDER — PHENOBARBITAL 64.8 MG/1
TABLET ORAL
Qty: 30 TABLET | Refills: 5 | Status: SHIPPED | OUTPATIENT
Start: 2017-08-30 | End: 2017-11-09

## 2017-09-04 ENCOUNTER — TELEPHONE (OUTPATIENT)
Dept: NEUROLOGY | Facility: CLINIC | Age: 62
End: 2017-09-04

## 2017-09-04 ENCOUNTER — APPOINTMENT (OUTPATIENT)
Dept: GENERAL RADIOLOGY | Facility: CLINIC | Age: 62
End: 2017-09-04
Attending: EMERGENCY MEDICINE
Payer: COMMERCIAL

## 2017-09-04 ENCOUNTER — HOSPITAL ENCOUNTER (EMERGENCY)
Facility: CLINIC | Age: 62
Discharge: HOME | End: 2017-09-04
Attending: EMERGENCY MEDICINE | Admitting: EMERGENCY MEDICINE
Payer: COMMERCIAL

## 2017-09-04 VITALS
DIASTOLIC BLOOD PRESSURE: 65 MMHG | SYSTOLIC BLOOD PRESSURE: 107 MMHG | OXYGEN SATURATION: 90 % | TEMPERATURE: 97.4 F | RESPIRATION RATE: 15 BRPM

## 2017-09-04 DIAGNOSIS — R56.9 SEIZURES (H): ICD-10-CM

## 2017-09-04 LAB
ALBUMIN SERPL-MCNC: 3.3 G/DL (ref 3.4–5)
ALBUMIN UR-MCNC: 30 MG/DL
ALP SERPL-CCNC: 139 U/L (ref 40–150)
ALT SERPL W P-5'-P-CCNC: 21 U/L (ref 0–50)
ANION GAP SERPL CALCULATED.3IONS-SCNC: 4 MMOL/L (ref 3–14)
APPEARANCE UR: CLEAR
AST SERPL W P-5'-P-CCNC: 40 U/L (ref 0–45)
BASOPHILS # BLD AUTO: 0.1 10E9/L (ref 0–0.2)
BASOPHILS NFR BLD AUTO: 0.5 %
BILIRUB SERPL-MCNC: 0.5 MG/DL (ref 0.2–1.3)
BILIRUB UR QL STRIP: NEGATIVE
BUN SERPL-MCNC: 11 MG/DL (ref 7–30)
CALCIUM SERPL-MCNC: 8.8 MG/DL (ref 8.5–10.1)
CARBAMAZEPINE SERPL-MCNC: 8.8 MG/L (ref 4–12)
CHLORIDE SERPL-SCNC: 97 MMOL/L (ref 94–109)
CO2 SERPL-SCNC: 30 MMOL/L (ref 20–32)
COLOR UR AUTO: YELLOW
CREAT SERPL-MCNC: 0.62 MG/DL (ref 0.52–1.04)
DIFFERENTIAL METHOD BLD: ABNORMAL
EOSINOPHIL # BLD AUTO: 0.2 10E9/L (ref 0–0.7)
EOSINOPHIL NFR BLD AUTO: 2.1 %
ERYTHROCYTE [DISTWIDTH] IN BLOOD BY AUTOMATED COUNT: 14.5 % (ref 10–15)
GFR SERPL CREATININE-BSD FRML MDRD: >90 ML/MIN/1.7M2
GLUCOSE BLDC GLUCOMTR-MCNC: 142 MG/DL (ref 70–99)
GLUCOSE SERPL-MCNC: 140 MG/DL (ref 70–99)
GLUCOSE UR STRIP-MCNC: NEGATIVE MG/DL
HCT VFR BLD AUTO: 41 % (ref 35–47)
HGB BLD-MCNC: 12.7 G/DL (ref 11.7–15.7)
HGB UR QL STRIP: NEGATIVE
IMM GRANULOCYTES # BLD: 0 10E9/L (ref 0–0.4)
IMM GRANULOCYTES NFR BLD: 0.2 %
KETONES UR STRIP-MCNC: NEGATIVE MG/DL
LEUKOCYTE ESTERASE UR QL STRIP: NEGATIVE
LYMPHOCYTES # BLD AUTO: 1 10E9/L (ref 0.8–5.3)
LYMPHOCYTES NFR BLD AUTO: 10.7 %
MCH RBC QN AUTO: 28.7 PG (ref 26.5–33)
MCHC RBC AUTO-ENTMCNC: 31 G/DL (ref 31.5–36.5)
MCV RBC AUTO: 93 FL (ref 78–100)
MONOCYTES # BLD AUTO: 0.5 10E9/L (ref 0–1.3)
MONOCYTES NFR BLD AUTO: 5.7 %
MUCOUS THREADS #/AREA URNS LPF: PRESENT /LPF
NEUTROPHILS # BLD AUTO: 7.4 10E9/L (ref 1.6–8.3)
NEUTROPHILS NFR BLD AUTO: 80.8 %
NITRATE UR QL: NEGATIVE
NRBC # BLD AUTO: 0 10*3/UL
NRBC BLD AUTO-RTO: 0 /100
PH UR STRIP: 7 PH (ref 5–7)
PHENOBARB SERPL-MCNC: 15 MG/L (ref 15–40)
PLATELET # BLD AUTO: 207 10E9/L (ref 150–450)
POTASSIUM SERPL-SCNC: 4.9 MMOL/L (ref 3.4–5.3)
PROT SERPL-MCNC: 9.8 G/DL (ref 6.8–8.8)
RBC # BLD AUTO: 4.42 10E12/L (ref 3.8–5.2)
RBC #/AREA URNS AUTO: 1 /HPF (ref 0–2)
SODIUM SERPL-SCNC: 131 MMOL/L (ref 133–144)
SOURCE: ABNORMAL
SP GR UR STRIP: 1.02 (ref 1–1.03)
SQUAMOUS #/AREA URNS AUTO: 2 /HPF (ref 0–1)
UROBILINOGEN UR STRIP-MCNC: 0 MG/DL (ref 0–2)
WBC # BLD AUTO: 9.2 10E9/L (ref 4–11)
WBC #/AREA URNS AUTO: 1 /HPF (ref 0–2)

## 2017-09-04 PROCEDURE — 25000128 H RX IP 250 OP 636: Performed by: EMERGENCY MEDICINE

## 2017-09-04 PROCEDURE — 80053 COMPREHEN METABOLIC PANEL: CPT | Performed by: EMERGENCY MEDICINE

## 2017-09-04 PROCEDURE — 96374 THER/PROPH/DIAG INJ IV PUSH: CPT

## 2017-09-04 PROCEDURE — 71020 XR CHEST 2 VW: CPT

## 2017-09-04 PROCEDURE — 00000146 ZZHCL STATISTIC GLUCOSE BY METER IP

## 2017-09-04 PROCEDURE — 80184 ASSAY OF PHENOBARBITAL: CPT | Performed by: EMERGENCY MEDICINE

## 2017-09-04 PROCEDURE — 80156 ASSAY CARBAMAZEPINE TOTAL: CPT | Performed by: EMERGENCY MEDICINE

## 2017-09-04 PROCEDURE — 99285 EMERGENCY DEPT VISIT HI MDM: CPT | Mod: 25

## 2017-09-04 PROCEDURE — 81001 URINALYSIS AUTO W/SCOPE: CPT | Performed by: EMERGENCY MEDICINE

## 2017-09-04 PROCEDURE — 85025 COMPLETE CBC W/AUTO DIFF WBC: CPT | Performed by: EMERGENCY MEDICINE

## 2017-09-04 PROCEDURE — 80299 QUANTITATIVE ASSAY DRUG: CPT | Performed by: EMERGENCY MEDICINE

## 2017-09-04 RX ORDER — LORAZEPAM 2 MG/ML
0.5 INJECTION INTRAMUSCULAR ONCE
Status: COMPLETED | OUTPATIENT
Start: 2017-09-04 | End: 2017-09-04

## 2017-09-04 RX ADMIN — LORAZEPAM 0.5 MG: 2 INJECTION INTRAMUSCULAR; INTRAVENOUS at 10:51

## 2017-09-04 ASSESSMENT — ENCOUNTER SYMPTOMS
SEIZURES: 1
CHILLS: 0
FEVER: 0

## 2017-09-04 NOTE — DISCHARGE INSTRUCTIONS
"  Recurrent Seizure (Adult)    You have had another seizure today. A common cause of seizures that keep happening (recurrent seizures) is missing doses of seizure medicine. But sometimes seizures are hard to control even when you take the medicine correctly. If this is the case for you, your healthcare provider may need to increase your dosage. Or you may need to add or change to another medicine.  Home care  Follow these tips when caring for yourself at home. For this seizure:    Seizures aren t predictable. So avoid doing anything that might cause danger to you or other people if you have another seizure. Until the seizures are under good control, take these precautions:    Don t drive, ride a motorcycle, or ride a bike.    Don t operate dangerous equipment such as power tools    Take showers instead of baths.    Don t swim or climb ladders, trees, or roofs.    Tell your close friends and relatives about your seizure. Teach them what to do for you if it happens again.    If medicine was prescribed to prevent seizures, take it exactly as directed. It does not work when taken \"as needed.\" Missing doses will increase the risk of having another seizure.    If you miss a dose, take the missed dose as soon as you remember. If it is almost time for your next dose, skip the missed dose. Restart the medicine at your next scheduled time. Don t take extra medicine to make up the missed dose.    Wear a \"Medic-Alert\" bracelet to let emergency personnel know about your condition.    Follow a regular sleep schedule such that you get at least 6 to 8 hours of restful sleep every night. This is especially important when you are sick with a cold or flu and/or another type of infection.  For future seizures, if you are alone:  If you feel a seizure coming on, lie down on a bed or on the floor with something soft under your head. Lie on your left side, not on your back. This will keep you from falling. It will also let fluid drain out " of your mouth and prevent choking. Be sure you are clear of any objects that might injure you during the seizure. Call for help if there is time.  For future seizures, if someone is with you:  The person should help you get into a safe position and call for help. The person shouldn t try to force anything in your mouth once the seizure begins. This could harm your teeth or jaw.  Follow-up care  Follow up with your healthcare provider. Keep a seizure calendar to record how often you have a seizure. If you are being started on anti-seizure medicine, make sure that you use additional birth control. Seizure medicine can affect how well birth control pills work, and you could become pregnant. Avoid alcohol until your doctor tells you it s OK.  Note: For the safety of yourself and others on the road, certain states require that the treating doctor tell the Public Health Department about any adult who is treated for a seizure and is at risk of more seizures. In this case, the Department of Motor Vehicles will be told. A restriction will be put on your  s license until a doctor gives you medical clearance to drive again. Contact your treating doctor to find out if your state requires the reporting of patients with a seizures condition.  When to seek medical advice  Call your healthcare provider right away if any of these occur:    Seizures happen more often or last longer than usual    A seizure lasts over 5 minutes    You don t wake up between seizures    Confusion that lasts more than 30 minutes after a seizure    Injury during a seizure    Fever over 100.4 F (38.0 C), or as advised    Unusual irritability, drowsiness, or confusion    Stiff or painful neck    Headache that gets worse   Date Last Reviewed: 8/1/2016 2000-2017 [employer]. [address] [place] [address], [place] [address]. All rights reserved. This information is not intended as a substitute for professional medical care. Always follow your  healthcare professional's instructions.

## 2017-09-04 NOTE — ED NOTES
"Pt aggressive, pulling cardiac leads off with full body \"muscle spasms\" that last approx 30-45 sec. Pt did not loose consciousness. Orientated. MD aware.   "

## 2017-09-04 NOTE — ED AVS SNAPSHOT
" [facility] Emergency Department    [address] [place] [address]    Phone:  [phone]    Fax:  [phone]                                       Zahra Benjamin   MRN: [id number]    Department:  [facility] Emergency Department   Date of Visit:  9/4/2017           Patient Information     Date Of Birth          1955        Your diagnoses for this visit were:     Seizures (H)        You were seen by Ian Bowers MD.      Follow-up Information     Follow up with Karthik Curtis MD In 3 days.    Specialty:  Neurology    Why:  As needed    Contact information:    [address]  [facility] [address] [phone]          Discharge Instructions         Recurrent Seizure (Adult)    You have had another seizure today. A common cause of seizures that keep happening (recurrent seizures) is missing doses of seizure medicine. But sometimes seizures are hard to control even when you take the medicine correctly. If this is the case for you, your healthcare provider may need to increase your dosage. Or you may need to add or change to another medicine.  Home care  Follow these tips when caring for yourself at home. For this seizure:    Seizures aren t predictable. So avoid doing anything that might cause danger to you or other people if you have another seizure. Until the seizures are under good control, take these precautions:    Don t drive, ride a motorcycle, or ride a bike.    Don t operate dangerous equipment such as power tools    Take showers instead of baths.    Don t swim or climb ladders, trees, or roofs.    Tell your close friends and relatives about your seizure. Teach them what to do for you if it happens again.    If medicine was prescribed to prevent seizures, take it exactly as directed. It does not work when taken \"as needed.\" Missing doses will increase the risk of having another seizure.    If you miss a dose, take the missed " "dose as soon as you remember. If it is almost time for your next dose, skip the missed dose. Restart the medicine at your next scheduled time. Don t take extra medicine to make up the missed dose.    Wear a \"Medic-Alert\" bracelet to let emergency personnel know about your condition.    Follow a regular sleep schedule such that you get at least 6 to 8 hours of restful sleep every night. This is especially important when you are sick with a cold or flu and/or another type of infection.  For future seizures, if you are alone:  If you feel a seizure coming on, lie down on a bed or on the floor with something soft under your head. Lie on your left side, not on your back. This will keep you from falling. It will also let fluid drain out of your mouth and prevent choking. Be sure you are clear of any objects that might injure you during the seizure. Call for help if there is time.  For future seizures, if someone is with you:  The person should help you get into a safe position and call for help. The person shouldn t try to force anything in your mouth once the seizure begins. This could harm your teeth or jaw.  Follow-up care  Follow up with your healthcare provider. Keep a seizure calendar to record how often you have a seizure. If you are being started on anti-seizure medicine, make sure that you use additional birth control. Seizure medicine can affect how well birth control pills work, and you could become pregnant. Avoid alcohol until your doctor tells you it s OK.  Note: For the safety of yourself and others on the road, certain states require that the treating doctor tell the Public Health Department about any adult who is treated for a seizure and is at risk of more seizures. In this case, the Department of Motor Vehicles will be told. A restriction will be put on your  s license until a doctor gives you medical clearance to drive again. Contact your treating doctor to find out if your state requires the " reporting of patients with a seizures condition.  When to seek medical advice  Call your healthcare provider right away if any of these occur:    Seizures happen more often or last longer than usual    A seizure lasts over 5 minutes    You don t wake up between seizures    Confusion that lasts more than 30 minutes after a seizure    Injury during a seizure    Fever over 100.4 F (38.0 C), or as advised    Unusual irritability, drowsiness, or confusion    Stiff or painful neck    Headache that gets worse   Date Last Reviewed: 8/1/2016 2000-2017 [employer]. [address] [place] [address]. All rights reserved. This information is not intended as a substitute for professional medical care. Always follow your healthcare professional's instructions.          Future Appointments        Provider Department Dept Phone Center    11/9/2017 1:30 PM Karthik Curtis MD [facility] [phone] [facility]      24 Hour Appointment Hotline       To make an appointment at any [facility], call [phone] ([phone]). If you don't have a family doctor or clinic, we will help you find one. [place] clinics are conveniently located to serve the needs of you and your family.             Review of your medicines      Our records show that you are taking the medicines listed below. If these are incorrect, please call your family doctor or clinic.        Dose / Directions Last dose taken    acetaminophen 500 MG tablet   Commonly known as:  TYLENOL   Dose:  1000 mg   Quantity:  100 tablet        Take 2 tablets by mouth every 8 hours.   Refills:  0        CALCIUM 600 + D 600-200 MG-UNIT Tabs        2 times daily   Refills:  3        calcium carbonate 750 MG Chew   Dose:  1500 mg        Take 1,500 mg by mouth daily.   Refills:  0        carBAMazepine 200 MG 12 hr tablet   Commonly known as:  TEGretol XR   Quantity:  120 tablet        Increase as directed to 1 tab po am - 1 tab po pm -  2 tab po pm   Refills:  11        cyabnocobalamin 2500 MCG sublingual tablet   Commonly known as:  VITAMIN B-12   Dose:  2500 mcg        Place 2,500 mcg under the tongue daily.   Refills:  0        docusate sodium 100 MG capsule   Commonly known as:  COLACE   Dose:  100 mg        Take 100 mg by mouth 2 times daily.   Refills:  0        magnesium oxide 400 MG tablet   Commonly known as:  MAG-OX   Dose:  400 mg        Take 400 mg by mouth At Bedtime.   Refills:  0        multivitamin, therapeutic with minerals Tabs tablet   Dose:  1 tablet        Take 1 tablet by mouth daily.   Refills:  0        NONFORMULARY        CBD Oil   Refills:  0        OLANZapine 2.5 MG tablet   Commonly known as:  zyPREXA   Dose:  1.25 mg   Quantity:  30 tablet        Take 0.5 tablets by mouth 2 times daily.   Refills:  0        * order for DME   Quantity:  1        knee   Refills:  0        pantoprazole 40 MG EC tablet   Commonly known as:  PROTONIX   Dose:  40 mg   Quantity:  60 tablet        Take 1 tablet by mouth 2 times daily.   Refills:  0        PHENobarbital 64.8 MG Tabs tablet   Commonly known as:  LUMINAL   Quantity:  30 tablet        TAKE 1 TABLET BY MOUTH AT BEDTIME   Refills:  5        ROSUVASTATIN CALCIUM PO   Dose:  10 mg        Take 10 mg by mouth daily   Refills:  0        SLOW IRON PO   Dose:  50 mg        Take 50 mg by mouth daily   Refills:  0        * STATIN NOT PRESCRIBED (INTENTIONAL)        by Other route continuous prn.   Refills:  0        * VIMPAT 100 MG Tabs tablet   Quantity:  30 tablet   Generic drug:  Lacosamide        TAKE 1 TABLET (100MG) BY MOUTH EVERY MORNING AND 150MG TABLET AT BEDTIME   Refills:  5        * lacosamide 150 MG Tabs tablet   Commonly known as:  VIMPAT   Quantity:  30 tablet        Take 1 tab po hs. (take the Vimpat 100 mg tab as directed)   Refills:  5        * Notice:  This list has 4 medication(s) that are the same as other medications prescribed for you. Read the directions carefully, and  ask your doctor or other care provider to review them with you.            Procedures and tests performed during your visit     CBC with platelets differential    Carbamazepine total    Chest XR,  PA & LAT    Comprehensive metabolic panel    Glucose by meter    Lacosamide Level    Peripheral IV: Standard    Phenobarbital level    UA with Microscopic reflex to Culture      Orders Needing Specimen Collection     None      Pending Results     Date and Time Order Name Status Description    9/4/2017 1038 Lacosamide Level In process             Pending Culture Results     No orders found from 9/2/2017 to 9/5/2017.            Pending Results Instructions     If you had any lab results that were not finalized at the time of your Discharge, you can call the ED Lab Result RN at [phone]. You will be contacted by this team for any positive Lab results or changes in treatment. The nurses are available 7 days a week from 10A to 6:30P.  You can leave a message 24 hours per day and they will return your call.        Test Results From Your Hospital Stay        9/4/2017 11:36 AM      Component Results     Component Value Ref Range & Units Status    WBC 9.2 4.0 - 11.0 10e9/L Final    RBC Count 4.42 3.8 - 5.2 10e12/L Final    Hemoglobin 12.7 11.7 - 15.7 g/dL Final    Hematocrit 41.0 35.0 - 47.0 % Final    MCV 93 78 - 100 fl Final    MCH 28.7 26.5 - 33.0 pg Final    MCHC 31.0 (L) 31.5 - 36.5 g/dL Final    RDW 14.5 10.0 - 15.0 % Final    Platelet Count 207 150 - 450 10e9/L Final    Diff Method Automated Method  Final    % Neutrophils 80.8 % Final    % Lymphocytes 10.7 % Final    % Monocytes 5.7 % Final    % Eosinophils 2.1 % Final    % Basophils 0.5 % Final    % Immature Granulocytes 0.2 % Final    Nucleated RBCs 0 0 /100 Final    Absolute Neutrophil 7.4 1.6 - 8.3 10e9/L Final    Absolute Lymphocytes 1.0 0.8 - 5.3 10e9/L Final    Absolute Monocytes 0.5 0.0 - 1.3 10e9/L Final    Absolute Eosinophils 0.2 0.0 - 0.7 10e9/L Final     Absolute Basophils 0.1 0.0 - 0.2 10e9/L Final    Abs Immature Granulocytes 0.0 0 - 0.4 10e9/L Final    Absolute Nucleated RBC 0.0  Final         9/4/2017 11:21 AM      Component Results     Component Value Ref Range & Units Status    Sodium 131 (L) 133 - 144 mmol/L Final    Potassium 4.9 3.4 - 5.3 mmol/L Final    Specimen slightly hemolyzed, potassium may be falsely elevated    Chloride 97 94 - 109 mmol/L Final    Carbon Dioxide 30 20 - 32 mmol/L Final    Anion Gap 4 3 - 14 mmol/L Final    Glucose 140 (H) 70 - 99 mg/dL Final    Urea Nitrogen 11 7 - 30 mg/dL Final    Creatinine 0.62 0.52 - 1.04 mg/dL Final    GFR Estimate >90 >60 mL/min/1.7m2 Final    Non  GFR Calc    GFR Estimate If Black >90 >60 mL/min/1.7m2 Final    African American GFR Calc    Calcium 8.8 8.5 - 10.1 mg/dL Final    Bilirubin Total 0.5 0.2 - 1.3 mg/dL Final    Albumin 3.3 (L) 3.4 - 5.0 g/dL Final    Protein Total 9.8 (H) 6.8 - 8.8 g/dL Final    Alkaline Phosphatase 139 40 - 150 U/L Final    ALT 21 0 - 50 U/L Final    AST 40 0 - 45 U/L Final         9/4/2017 11:44 AM      Component Results     Component Value Ref Range & Units Status    Phenobarbital Level 15 15 - 40 mg/L Final         9/4/2017 10:51 AM         9/4/2017 11:44 AM      Component Results     Component Value Ref Range & Units Status    Carbamazepine Level 8.8 4.0 - 12.0 mg/L Final         9/4/2017 10:40 AM      Component Results     Component Value Ref Range & Units Status    Glucose 142 (H) 70 - 99 mg/dL Final         9/4/2017 11:55 AM      Narrative     CHEST TWO VIEWS  9/4/2017 11:31 AM     HISTORY: Hypoxia.    COMPARISON: 12/3/2011        Impression     IMPRESSION: Mild cardiomegaly. No evidence for congestive heart  failure or any infiltrates.    TRICE MEDELLIN MD         9/4/2017 12:42 PM      Component Results     Component Value Ref Range & Units Status    Color Urine Yellow  Final    Appearance Urine Clear  Final    Glucose Urine Negative NEG^Negative mg/dL Final     Bilirubin Urine Negative NEG^Negative Final    Ketones Urine Negative NEG^Negative mg/dL Final    Specific Gravity Urine 1.016 1.003 - 1.035 Final    Blood Urine Negative NEG^Negative Final    pH Urine 7.0 5.0 - 7.0 pH Final    Protein Albumin Urine 30 (A) NEG^Negative mg/dL Final    Urobilinogen mg/dL 0.0 0.0 - 2.0 mg/dL Final    Nitrite Urine Negative NEG^Negative Final    Leukocyte Esterase Urine Negative NEG^Negative Final    Source Catheterized Urine  Final    WBC Urine 1 0 - 2 /HPF Final    RBC Urine 1 0 - 2 /HPF Final    Squamous Epithelial /HPF Urine 2 (H) 0 - 1 /HPF Final    Mucous Urine Present (A) NEG^Negative /LPF Final                Clinical Quality Measure: Blood Pressure Screening     Your blood pressure was checked while you were in the emergency department today. The last reading we obtained was  BP: 107/65 . Please read the guidelines below about what these numbers mean and what you should do about them.  If your systolic blood pressure (the top number) is less than 120 and your diastolic blood pressure (the bottom number) is less than 80, then your blood pressure is normal. There is nothing more that you need to do about it.  If your systolic blood pressure (the top number) is 120-139 or your diastolic blood pressure (the bottom number) is 80-89, your blood pressure may be higher than it should be. You should have your blood pressure rechecked within a year by a primary care provider.  If your systolic blood pressure (the top number) is 140 or greater or your diastolic blood pressure (the bottom number) is 90 or greater, you may have high blood pressure. High blood pressure is treatable, but if left untreated over time it can put you at risk for heart attack, stroke, or kidney failure. You should have your blood pressure rechecked by a primary care provider within the next 4 weeks.  If your provider in the emergency department today gave you specific instructions to follow-up with your doctor  "or provider even sooner than that, you should follow that instruction and not wait for up to 4 weeks for your follow-up visit.        Thank you for choosing [place]       Thank you for choosing [place] for your care. Our goal is always to provide you with excellent care. Hearing back from our patients is one way we can continue to improve our services. Please take a few minutes to complete the written survey that you may receive in the mail after you visit with us. Thank you!        [employer] Information     [employer] lets you send messages to your doctor, view your test results, renew your prescriptions, schedule appointments and more. To sign up, go to [email or web address] . Click on \"Log in\" on the left side of the screen, which will take you to the Welcome page. Then click on \"Sign up Now\" on the right side of the page.     You will be asked to enter the access code listed below, as well as some personal information. Please follow the directions to create your username and password.     Your access code is: [email or web address]  Expires: 12/3/2017  1:02 PM     Your access code will  in 90 days. If you need help or a new code, please call your [place] clinic or [phone].        Care EveryWhere ID     This is your Care EveryWhere ID. This could be used by other organizations to access your [place] medical records  [id number]        Equal Access to Services     HERMINIO GRIMES AH: Hadclara Lebron, waaxda luqadaha, qaybta kaalmayocasta alberts. So [facility] [phone].    ATENCIÓN: Si habla español, tiene a candelario disposición servicios gratuitos de asistencia lingüística. April al [phone].    We comply with applicable federal civil rights laws and [place] laws. We do not discriminate on the basis of race, color, national origin, age, disability sex, sexual orientation or gender identity.            After Visit Summary       This is your record. Keep this with " you and show to your community pharmacist(s) and doctor(s) at your next visit.

## 2017-09-04 NOTE — ED PROVIDER NOTES
History     Chief Complaint:  Seizures    HPI   Zahra Bnejamin is a 62 year old female who presents to the ED for the evaluation of seizures. The patient had 4 seizures today, to which she was brought here via EMS. However, the patient does not remember the seizures, as she only remembers watching TV prior to them. The patient reports that she takes Tegretol for seizures and has been feeling otherwise okay the past fever days. She denies fever or diabetes. Of note she goes to [facility] for her seizures usually.    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 No pertinent family history.     Social History:  Smoking status: Former, quit 2016  Alcohol use: No  Marital Status:  Single [1]     Review of Systems   Constitutional: Negative for chills and fever.   Neurological: Positive for seizures.   All other systems reviewed and are negative.      Physical Exam     Patient Vitals for the past 24 hrs:   BP Temp Temp src Heart Rate Resp SpO2   09/04/17 1300 - - - 92 15 90 %   09/04/17 1255 107/65 - - 89 15 94 %   09/04/17 1245 - - - 89 14 95 %   09/04/17 1240 123/71 - - 91 13 94 %   09/04/17 1230 - - - 96 11 91 %   09/04/17 1215 - - - 96 13 95 %   09/04/17 1210 107/80 - - 96 11 97 %   09/04/17 1200 - - - 91 13 96 %   09/04/17 1155 97/82 - - 92 8 99 %   09/04/17 1145 - - - 97 19 96 %   09/04/17 1110 128/73 - - 93 10 94 %   09/04/17 1045 - - - 92 - -   09/04/17 1041 - - - 93 15 (!) 87 %   09/04/17 1029 115/85 - - 95 - -   09/04/17 1026  115/85 97.4  F (36.3  C) Oral 99 14 90 %        Physical Exam   Constitutional: She is oriented to person, place, and time. She appears well-developed.   Obese, bilateral lower leg amputee.   HENT:   Head: Normocephalic and atraumatic.   Right Ear: External ear normal.   Mouth/Throat: Oropharynx is clear and moist.   Eyes: Conjunctivae and EOM are normal. Pupils are equal, round, and reactive to light.   Neck: Normal range of motion. Neck supple. No JVD present.   Cardiovascular: Normal rate, regular rhythm and normal heart sounds.    Pulmonary/Chest: Effort normal and breath sounds normal.   Abdominal: Soft. Bowel sounds are normal. She exhibits no distension. There is no tenderness. There is no rebound.   Musculoskeletal: Normal range of motion.   Lymphadenopathy:     She has no cervical adenopathy.   Neurological: She is alert and oriented to person, place, and time. She displays normal reflexes. No cranial nerve deficit. She exhibits normal muscle tone. Coordination normal.   Skin: Skin is warm and dry.   Psychiatric: She has a normal mood and affect. Her behavior is normal. Judgment normal.   Nursing note and vitals reviewed.        Emergency Department Course   Imaging:  Radiographic findings were communicated with the patient who voiced understanding of the findings.    X-ray Chest, 2 views:  Mild cardiomegaly. No evidence for congestive heart  failure or any infiltrates.  As read by Radiology.    Laboratory:  CBC: WNL (WBC 9.2, HGB 12.7, )   CMP: (L), Glucose 140(H), Albumin 3.3(L), Protein 9.8(H), o/w WNL (Creatinine 0.62)  Phenobarbital level: 15  Lacosamide level: pending  Carbamazepine total: 8.8  Glucose: 142(H)    Interventions:  1051: Ativan 0.5 mg, IV    Emergency Department Course:  The patient arrived in the emergency department via EMS.  Past medical records, nursing notes, and vitals reviewed.  10:35am: I performed an exam of the patient and obtained history, as documented above.    IV inserted and blood drawn.  The patient was sent for a xray while in the emergency department, findings above.    11:50am: I rechecked the patient. Explained findings to the patient and family.  12:39pm: I discussed the patient with Dr. Coker of neurology.  12:56pm: I rechecked the patient. Explained findings to the patient.    I rechecked the patient. Findings and plan explained to the Patient. Patient discharged home with instructions regarding supportive care, medications, and reasons to return. The importance of close follow-up was reviewed.     Impression & Plan    Medical Decision Making:  Patient presents with seizures at home. Patient has a history of complex partial seizures and is followed my [facility] epilepsy group. Patient on arrival was somewhat confused but improved in time. She was observed for a few hours and given 0.5 mg of Ativan but not further seizure activity was identified. Patient was noted to be slightly hypoxic at a level of 88-89% though patient is morbidly obese and sits somewhat flat and I suspect this is related to atelectasis. Chest xray is negative for pneumonia. I did consider PE; patient is not tachycardic and has no symptoms and therefore do not feel that the risk for CT scanning is worth further evaluations of hypoxia. Clinical suspicions are for atelectasis. Have recommended incentive spirometer and follow up with Lynmatthew to discuss adjustments in her medication.    Diagnosis:    ICD-10-CM   1. Recurrent seizures (H) R56.9       Disposition:  discharged to home        Tiffanie Yo  9/4/2017   [facility] EMERGENCY DEPARTMENT  I, Tiffanie Yo, am serving as a scribe at 10:35 AM on 9/4/2017 to document services personally performed by Ian Bowers MD based on my observations and the provider's statements to me.        Ian Bowers MD  09/05/17 [id number]

## 2017-09-04 NOTE — TELEPHONE ENCOUNTER
Spoke to Dr. Moeller. Patient is followed by Dr. Curtis. She had 3-4 seizure at home, staring, talking funny, she is agitated. No active infection. Pb was 15, carbamazepine was 8.8. Send her home with current medications. Its seems that prior to starting carbamazepine she had 2 complex partial seizure per month and now after carbamazepine was started she has more seizures. I will request that she see Dr. Curtis and he re-evaluate if carbamazepine is helpful and if dose should be increased.     Current antiepileptic drugs:     Phenobarbital   Vimpat   Carbamazepine     Prior to Admission medications    Medication Sig Start Date End Date Taking? Authorizing Provider   PHENobarbital (LUMINAL) 64.8 MG TABS tablet TAKE 1 TABLET BY MOUTH AT BEDTIME 8/30/17   Karthik Curtis MD   VIMPAT 100 MG TABS tablet TAKE 1 TABLET (100MG) BY MOUTH EVERY MORNING AND 150MG TABLET AT BEDTIME 7/5/17   Karthik Curtis MD   lacosamide (VIMPAT) 150 MG TABS tablet Take 1 tab po hs. (take the Vimpat 100 mg tab as directed) 7/5/17   Karthik Curtis MD   NONFORMULARY CBD Oil    Reported, Patient   carBAMazepine (TEGRETOL XR) 200 MG 12 hr tablet Increase as directed to 1 tab po am - 1 tab po pm - 2 tab po pm 1/6/17   Karthik Curtis MD   ROSUVASTATIN CALCIUM PO Take 10 mg by mouth daily    Reported, Patient   Ferrous Sulfate Dried (SLOW IRON PO) Take 50 mg by mouth daily    Reported, Patient   acetaminophen (TYLENOL) 500 MG tablet Take 2 tablets by mouth every 8 hours. 12/15/11   Walt Zhang MD   multivitamin, therapeutic with minerals (THERA-VIT-M) TABS Take 1 tablet by mouth daily. 12/15/11   Walt Zhang MD   olanzapine (ZYPREXA) 2.5 MG tablet Take 0.5 tablets by mouth 2 times daily. 12/15/11   Walt Zhang MD   PANtoprazole (PROTONIX) 40 MG enteric coated tablet Take 1 tablet by mouth 2 times daily. 12/15/11   Walt Zhang MD   magnesium oxide  (MAG-OX) 400 MG tablet Take 400 mg by mouth At Bedtime.    Unknown, Entered By History   calcium carbonate 750 MG CHEW Take 1,500 mg by mouth daily.    Unknown, Entered By History   docusate sodium (COLACE) 100 MG capsule Take 100 mg by mouth 2 times daily.    Unknown, Entered By History   Cyanocobalamin (VITAMIN B-12) 2500 MCG tablet Place 2,500 mcg under the tongue daily.    Unknown, Entered By History   STATIN NOT PRESCRIBED, INTENTIONAL, by Other route continuous prn. 1/26/11   Jersey Coley MD   CALCIUM 600 + D 600-200 MG-UNIT OR TABS 2 times daily 10/8/09   Jersey Coley MD   ORDER FOR DME knee 10/17/06   Kevon Goodman MD Sima I. Patel, MD

## 2017-09-04 NOTE — ED AVS SNAPSHOT
[facility]    [address]    [facility] [address]    Phone:  [phone]    Fax:  [phone]                                       Zahra Benjamin   MRN: [id number]    Department:  [facility]   Date of Visit:  9/4/2017           After Visit Summary Signature Page     I have received my discharge instructions, and my questions have been answered. I have discussed any challenges I see with this plan with the nurse or doctor.    ..........................................................................................................................................  Patient/Patient Representative Signature      ..........................................................................................................................................  Patient Representative Print Name and Relationship to Patient    ..................................................               ................................................  Date                                            Time    ..........................................................................................................................................  Reviewed by Signature/Title    ...................................................              ..............................................  Date                                                            Time

## 2017-09-05 LAB — LACOSAMIDE SERPL-MCNC: 2.2 UG/ML (ref 5–10)

## 2017-11-02 ENCOUNTER — TRANSFERRED RECORDS (OUTPATIENT)
Dept: HEALTH INFORMATION MANAGEMENT | Facility: CLINIC | Age: 62
End: 2017-11-02

## 2017-11-09 ENCOUNTER — OFFICE VISIT (OUTPATIENT)
Dept: NEUROLOGY | Facility: CLINIC | Age: 62
End: 2017-11-09

## 2017-11-09 VITALS — DIASTOLIC BLOOD PRESSURE: 74 MMHG | SYSTOLIC BLOOD PRESSURE: 142 MMHG

## 2017-11-09 DIAGNOSIS — G40.109 SYMPTOMATIC LOCALIZATION-RELATED EPILEPSY (H): ICD-10-CM

## 2017-11-09 DIAGNOSIS — G40.209 LOCALIZATION-RELATED PARTIAL EPILEPSY WITH COMPLEX PARTIAL SEIZURES (H): ICD-10-CM

## 2017-11-09 DIAGNOSIS — G40.219 LOCALIZATION-RELATED SYMPTOMATIC EPILEPSY AND EPILEPTIC SYNDROMES WITH COMPLEX PARTIAL SEIZURES, INTRACTABLE, WITHOUT STATUS EPILEPTICUS (H): ICD-10-CM

## 2017-11-09 LAB
% GRANULOCYTES: 77.3 % (ref 42.2–75.2)
ALBUMIN SERPL-MCNC: 3.2 G/DL (ref 3.3–5.5)
ALP SERPL-CCNC: 95 U/L (ref 42–141)
ALT SERPL W/O P-5'-P-CCNC: 15 IU/L (ref 10–47)
ALT SERPL W/O P-5'-P-CCNC: 21 IU/L (ref 10–47)
AST SERPL-CCNC: 33 IU/L (ref 11–38)
AST SERPL-CCNC: 35 IU/L (ref 11–38)
BILIRUB SERPL-MCNC: 0.5 MG/DL (ref 0.2–1.6)
BUN SERPL-MCNC: 14 MG/DL (ref 7–22)
CALCIUM SERPL-MCNC: 9.1 MG/DL (ref 8–10.3)
CHLORIDE SERPLBLD-SCNC: 98 MMOL/L (ref 98–108)
CHOLEST SERPL-MCNC: 190 MG/DL
CHOLEST/HDLC SERPL: 2.8 {RATIO}
CO2 SERPL-SCNC: 30 MMOL/L (ref 18–33)
CREAT SERPL-MCNC: 0.9 MG/DL (ref 0.6–1.2)
ERYTHROCYTE [DISTWIDTH] IN BLOOD BY AUTOMATED COUNT: 17 % (ref 11.6–13.7)
GFR SERPL CREATININE-BSD FRML MDRD: >60 ML/MIN/1.7
GLUCOSE SERPL-MCNC: 113 MG/DL (ref 73–118)
GLUCOSE SERPL-MCNC: 115 MG/DL (ref 73–118)
HBA1C MFR BLD: 6 % (ref 0–5.7)
HCT VFR BLD AUTO: 41.2 % (ref 35–45)
HDLC SERPL-MCNC: 69 MG/DL
HEMOGLOBIN: 13.2 G/DL (ref 11.7–15.5)
LACOSAMIDE: 6.5 MCG/ML (ref ?–15)
LDL CHOLESTEROL: 90 MG/DL
LYMPHOCYTES NFR BLD AUTO: 16.8 % (ref 20.5–51.1)
MCH RBC QN AUTO: 29.3 PG (ref 27–33)
MCHC RBC AUTO-ENTMCNC: 32.2 G/DL (ref 32–36)
MCV RBC AUTO: 91.2 FL (ref 80–100)
MONOCYTES NFR BLD AUTO: 5.9 % (ref 1.7–9.3)
NONHDLC SERPL-MCNC: 122 MG/DL
PHENOBARBITAL LEVEL: 13.8 MCG/ML (ref 15–40)
PLATELET # BLD AUTO: 150 K/UL (ref 140–400)
PMV BLD: 8.9 FL (ref 5–20)
POTASSIUM SERPL-SCNC: 4.7 MMOL/L (ref 3.6–5.1)
PROTEIN TOTAL (EXTERNAL): 9.1 G/DL (ref 6.4–8.1)
RBC # BLD AUTO: 4.51 K/UL (ref 3.8–5.1)
SODIUM SERPL-SCNC: 140 MMOL/L (ref 128–145)
TRIGL SERPL-MCNC: 156 MG/DL
VLDL CHOLESTEROL: 31 MG/DL
WBC # BLD AUTO: 6.1 10^9/L (ref 3.8–10.8)

## 2017-11-09 RX ORDER — PHENOBARBITAL 64.8 MG/1
64.8 TABLET ORAL AT BEDTIME
Qty: 30 TABLET | Refills: 5 | Status: SHIPPED | OUTPATIENT
Start: 2017-11-09 | End: 2018-06-11

## 2017-11-09 RX ORDER — HYDROXYZINE PAMOATE 25 MG/1
25 CAPSULE ORAL 4 TIMES DAILY PRN
COMMUNITY

## 2017-11-09 RX ORDER — LACOSAMIDE 150 MG/1
150 TABLET ORAL AT BEDTIME
COMMUNITY
End: 2020-05-14

## 2017-11-09 RX ORDER — CARBAMAZEPINE 200 MG/1
TABLET, EXTENDED RELEASE ORAL
Qty: 120 TABLET | Refills: 11 | Status: SHIPPED | OUTPATIENT
Start: 2017-11-09 | End: 2018-06-27

## 2017-11-09 RX ORDER — LACOSAMIDE 150 MG/1
TABLET ORAL
Qty: 30 TABLET | Refills: 5 | Status: SHIPPED | OUTPATIENT
Start: 2017-11-09 | End: 2020-05-14

## 2017-11-09 RX ORDER — LACOSAMIDE 100 MG/1
TABLET ORAL
Qty: 30 TABLET | Refills: 5 | Status: SHIPPED | OUTPATIENT
Start: 2017-11-09 | End: 2018-06-11

## 2017-11-09 RX ORDER — ONDANSETRON 4 MG/1
4-8 TABLET, FILM COATED ORAL
COMMUNITY
End: 2023-01-17

## 2017-11-09 NOTE — LETTER
2017       RE: Zahra Benjamin  : 1955   MRN: [id number]      Dear Colleague,    Thank you for referring your patient, Zahra Benjamin, to the [facility]ARE at [facility]. Please see a copy of my visit note below.    [facility]      Patient:  Zahra Benjamin  :  1955   Age:  62 year old   Today's Office Visit:  2017    Epilepsy Data:    Seizure Record  Current Visit Date: 17  Previous Visit Date: 17  Months since last visit: 6.05    Seizure Type 1: Simple partial onset followed by impairment of consciousness  Description of Sz Type 1: stare with unilat automatims and stiffening  # of Type 1 Seizure since last visit: 5  Freq. Type 1 / Month: 0.83    History of Present Illness:     Admitted for Sep 9 for series of seizures., three were  witnessed.  Had skipped dose of medications night before, reportedly because staff did not administer.  Nonetheless AED levels were carbamazepine 8.8, lacosamide 2.2, PB 15.    Otherwise staff have not seen any seizures since May. They feel she is much better since carbamazepine started.    No GTCs for many years. Last     Current Outpatient Prescriptions   Medication Sig Dispense Refill     lacosamide (VIMPAT) 150 MG TABS tablet Take 150 mg by mouth At Bedtime       PHENobarbital (LUMINAL) 64.8 MG TABS tablet TAKE 1 TABLET BY MOUTH AT BEDTIME 30 tablet 5     VIMPAT 100 MG TABS tablet TAKE 1 TABLET (100MG) BY MOUTH EVERY MORNING AND 150MG TABLET AT BEDTIME 30 tablet 5     lacosamide (VIMPAT) 150 MG TABS tablet Take 1 tab po hs. (take the Vimpat 100 mg tab as directed) 30 tablet 5     NONFORMULARY CBD Oil       carBAMazepine (TEGRETOL XR) 200 MG 12 hr tablet Increase as directed to 1 tab po am - 1 tab po pm - 2 tab po pm 120 tablet 11     ROSUVASTATIN CALCIUM PO Take 10 mg by mouth daily       Ferrous Sulfate Dried (SLOW IRON PO) Take 50 mg by mouth daily       multivitamin,  therapeutic with minerals (THERA-VIT-M) TABS Take 1 tablet by mouth daily.       olanzapine (ZYPREXA) 2.5 MG tablet Take 0.5 tablets by mouth 2 times daily. 30 tablet 0     PANtoprazole (PROTONIX) 40 MG enteric coated tablet Take 1 tablet by mouth 2 times daily. 60 tablet 0     magnesium oxide (MAG-OX) 400 MG tablet Take 400 mg by mouth At Bedtime.       calcium carbonate 750 MG CHEW Take 1,500 mg by mouth daily.       docusate sodium (COLACE) 100 MG capsule Take 100 mg by mouth 2 times daily.       Cyanocobalamin (VITAMIN B-12) 2500 MCG tablet Place 2,500 mcg under the tongue daily.       CALCIUM 600 + D 600-200 MG-UNIT OR TABS 2 times daily  3     ORDER FOR DME knee 1 0     hydrOXYzine (VISTARIL) 25 MG capsule Take 25 mg by mouth       ondansetron (ZOFRAN) 4 MG tablet Take 4-8 mg by mouth       acetaminophen (TYLENOL) 500 MG tablet Take 2 tablets by mouth every 8 hours. 100 tablet      STATIN NOT PRESCRIBED, INTENTIONAL, by Other route continuous prn.  0        Perceived AED Side Effects:  No    Medication Notes:    No side effecst.  Had labs drawn by Babatunde [facility]ina [facility] [phone]. Does not want blood drawn here.  AED Medication Compliance:  compliant all of the time  Using a pill box:  Yes    Review of Systems:  Lethargy / Tiredness:  No  Nausea / Vomiting:  No  Double Vision:  No  Sleepiness:  No  Depression:  No  Slowed Cognitive Function:  No  Memory Problems:  Yes  Poor Balance:  NA; wheel chair bound.  Dizziness:  No  Appetite Changes:  No  Blurred Vision:  No  Decreased Libido:  NA  Sleep Changes:  No  Behavioral Changes:  No  Skin: negative  Respiratory: No shortness of breath, dyspnea on exertion, cough, or hemoptysis and No shortness of breath  Cardiovascular: negative  Have you experienced a traumatic fall since your last visit: NO  Are these falls related to your seizures:  Not Applicable    Other Issues:    No symptoms of TIA, including  amaurosis, aphasia, focal weakness or numbness.  On medical cannabis for chronic pain. Gold oil from [place] [facility]. This appears to be THC rich.  Is patient safe to drive:  No    Woman Care:   Patient is:  Postmenopausal.    Exam:    /74 (BP Location: Left arm, Patient Position: Chair, Cuff Size: Adult Regular)     Wt Readings from Last 5 Encounters:   12/03/11 147 lb 0.2 oz (66.7 kg)   10/08/09 159 lb (72.1 kg)   10/17/06 135 lb (61.2 kg)   08/29/06 135 lb (61.2 kg)   06/22/06 135 lb (61.2 kg)     Heart exam without murmur. RRR. No carotid bruit. Little irritabilitiy; does not appear overtly depressed. Cooperative; much less bitter than several years ago. Language fluent, follows commands, appropriate. In wheel chair with bilateral leg amputations so cannot evaluate gait. Visual fields full. Extraocular movements intact without nystagmus. Smile symmetrical. Facial sensation equal. Tongue midline and strong. No drift, pronation, or tremor. Reflexes difficult to obtain in UE, AKA left, BKA right. Finger finger nose is done well.    We were able to obtain levels drawn recently after patient left clinic and scan into system.    Carbamazepine and phenobarbital levels stable and did not drop during recent ER visit. Lacosamide level however did drop as in table.    Results for MARIAM GUTIERREZ (MRN [id number]) as of 11/9/2017 17:23   Ref. Range 7/6/2004 15:57 8/5/2005 11:09 9/6/2005 13:09 5/17/2006 16:25 10/18/2007 17:15 12/3/2011 12:05 12/6/2011 08:00 3/24/2014 00:00 12/11/2014 13:01 6/5/2015 15:45 11/7/2016 15:03 5/9/2017 13:39 9/4/2017 10:28 11/2/2017 14:32   Lacosamide Latest Ref Range: 15.0 mcg/ml        10.3 11.8 9.6 10.0  2.2 (L) 6.5         Assessment and Plan:   1) Partial seizures, left temporal onset. Seizures appear to have improved significantly following addition of carbamazepine. Still not certain we are detecting all seizures but between consistent caregiver and family observations feel pretty  confident we have made progress.  The recent worsening appears to have occurred because of missed AED dose. Lacosamide level did in fact drop significantly. This suggests that lacosamide dose was missed. Further suggests that lacosamide is helpful.  2) Severe vitamin D deficiency; patient refuses Vit D supplementation but is at least allowing MVI.  3) History of significant peripheral vascular disease. Status post left CEA. No evidence of TIA.  4) Probably continues with at least dysthymia. Not major depression and not suicidal. Better than previously, probably because on less phenobarbital.      DISCUSSION  Discussed how aggressive we should be given the fact she is wheel chair bound and not having convulsions. She again feels that things are going fine and does not want more intensive workup or AED manipulations. She feels that current AEDs have helped and is glad that carbamazepine was started.      PLAN:  1) Same phenobarbital, carbamazepine and lacosamide.  2) We again suggested Vit D supplementation and patient again aggressively resisted.  3) Maximizing carbamazepine or lacosamide; substituting OXC for carbamazepine or LTG for PB could be considered if clinically indicated.  4) RTC 6 months.  5) If we are not making progress may need to reimage especially given persistent left temporal slowing and epileptiform activity.    Again, thank you for allowing me to participate in the care of your patient.      Sincerely,    Karthik Curtis MD

## 2017-11-09 NOTE — MR AVS SNAPSHOT
After Visit Summary   11/9/2017    Zahra Benjamin    MRN: [id number]           Patient Information     Date Of Birth          1955        Visit Information        Provider Department      11/9/2017 1:30 PM Karthik Curtis MD 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 Call us [phone] if you have quesitons about where to send results or other questoins. Otherwise keep taking antiseizure medicatoins the way you are now.          Follow-ups after your visit        Follow-up notes from your care team     Return in about 6 months (around 5/9/2018).      Your next 10 appointments already scheduled     May 09, 2018  1:00 PM CDT   Return Visit with Karthik Curtis MD   [facility] ([facility])    [address], [address]  [facility] [id number] [phone]              Who to contact     Please call your clinic at [phone] to:    Ask questions about your health    Make or cancel appointments    Discuss your medicines    Learn about your test results    Speak to your doctor   If you have compliments or concerns about an experience at your clinic, or if you wish to file a complaint, please contact [facility] Patient Relations at [phone] or email us at [email or web address]         Additional Information About Your Visit        MyChart Information     [employer] is an electronic gateway that provides easy, online access to your medical records. With [employer], you can request a clinic appointment, read your test results, renew a prescription or communicate with your  care team.     To sign up for [employer] visit the website at [email or web address]   You will be asked to enter the access code listed below, as well as some personal information. Please follow the directions to create your username and password.     Your access code is: [email or web address]  Expires: 12/3/2017 12:02 PM     Your access code will  in 90 days. If you need help or a new code, please contact your [facility] or call [phone] for assistance.        Care EveryWhere ID     This is your Care EveryWhere ID. This could be used by other organizations to access your [place] medical records  [id number]         Blood Pressure from Last 3 Encounters:   17 142/74   17 107/65   17 135/61    Weight from Last 3 Encounters:   11 147 lb 0.2 oz (66.7 kg)   10/08/09 159 lb (72.1 kg)   10/17/06 135 lb (61.2 kg)              Today, you had the following     No orders found for display         Today's Medication Changes          These changes are accurate as of: 17  2:10 PM.  If you have any questions, ask your nurse or doctor.               These medicines have changed or have updated prescriptions.        Dose/Directions    PHENobarbital 64.8 MG Tabs tablet   Commonly known as:  LUMINAL   This may have changed:  See the new instructions.   Used for:  Symptomatic localization-related epilepsy (H)   Changed by:  Karthik Curtis MD        Dose:  64.8 mg   Take 1 tablet (64.8 mg) by mouth At Bedtime   Quantity:  30 tablet   Refills:  5       * VIMPAT 150 MG Tabs tablet   This may have changed:  Another medication with the same name was changed. Make sure you understand how and when to take each.   Generic drug:  lacosamide   Changed by:  Karthik Curtis MD        Dose:  150 mg   Take 150 mg by mouth At Bedtime   Refills:  0       * Lacosamide 100 MG Tabs tablet   Commonly known as:  VIMPAT   This may have changed:  See the new  instructions.   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 Make sure you understand how and when to take each.   Used for:  Localization-related symptomatic epilepsy and epileptic syndromes with complex partial seizures, intractable, without status epilepticus (H)   Changed by:  Karthik Curtis MD        Take 1 tab po hs. (take the Vimpat 100 mg tab as directed)   Quantity:  30 tablet   Refills:  5       * Notice:  This list has 3 medication(s) that are the same as other medications prescribed for you. Read the directions carefully, and ask your doctor or other care provider to review them with you.         Where to get your medicines      These medications were sent to [facility] - [place], [place] - [address]  [address] [facility] [address]     Phone:  [phone]     carBAMazepine 200 MG 12 hr tablet         Some of these will need a paper prescription and others can be bought over the counter.  Ask your nurse if you have questions.     Bring a paper prescription for each of these medications     Lacosamide 100 MG Tabs tablet    lacosamide 150 MG Tabs tablet    PHENobarbital 64.8 MG Tabs tablet                Primary Care Provider Office Phone # Fax #    Babatunde Molina PA-C [phone]       [facility] [address] [facility] [address]        Equal Access to Services     [facility]: Hadii dalia ocampo [employer], waaxda luqadaha, qaybta kaalmada adeegyakathie, yocasta rothman. So [facility] [phone].    ATENCIÓN: Si habla español, tiene a candelario disposición servicios gratuitos de  carlaa lingüística. April al [phone].    We comply with applicable federal civil rights laws and [place] laws. We do not discriminate on the basis of race, color, national origin, age, disability, sex, sexual orientation, or gender identity.            Thank you!     Thank you for choosing [facility]  for your care. Our goal is always to provide you with excellent care. Hearing back from our patients is one way we can continue to improve our services. Please take a few minutes to complete the written survey that you may receive in the mail after your visit with us. Thank you!             Your Updated Medication List - Protect others around you: Learn how to safely use, store and throw away your medicines at www.disposemymeds.org.          This list is accurate as of: 11/9/17  2:10 PM.  Always use your most recent med list.                   Brand Name Dispense Instructions for use Diagnosis    acetaminophen 500 MG tablet    TYLENOL    100 tablet    Take 2 tablets by mouth every 8 hours.    PVD (peripheral vascular disease) (H)       CALCIUM 600 + D 600-200 MG-UNIT Tabs      2 times daily        calcium carbonate 750 MG Chew      Take 1,500 mg by mouth daily.        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        docusate sodium 100 MG capsule    COLACE     Take 100 mg by mouth 2 times daily.        hydrOXYzine 25 MG capsule    VISTARIL     Take 25 mg by mouth        magnesium oxide 400 MG tablet    MAG-OX     Take 400 mg by mouth At Bedtime.        multivitamin, therapeutic with minerals Tabs tablet      Take 1 tablet by mouth daily.    Iron deficiency anemia, unspecified       NONFORMULARY      CBD Oil        OLANZapine 2.5 MG  tablet    zyPREXA    30 tablet    Take 0.5 tablets by mouth 2 times daily.    Anxiety       ondansetron 4 MG tablet    ZOFRAN     Take 4-8 mg by mouth        * order for DME     1    knee        pantoprazole 40 MG EC tablet    PROTONIX    60 tablet    Take 1 tablet by mouth 2 times daily.    Hemorrhage of gastrointestinal tract, unspecified       PHENobarbital 64.8 MG Tabs tablet    LUMINAL    30 tablet    Take 1 tablet (64.8 mg) by mouth At Bedtime    Symptomatic localization-related epilepsy (H)       ROSUVASTATIN CALCIUM PO      Take 10 mg by mouth daily        SLOW IRON PO      Take 50 mg by mouth daily    Unspecified epilepsy without mention of intractable epilepsy       * STATIN NOT PRESCRIBED (INTENTIONAL)      by Other route continuous prn.    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 Read the directions carefully, and ask your doctor or other care provider to review them with you.

## 2017-11-09 NOTE — PATIENT INSTRUCTIONS
Please fax lab resutls to [phone]. Call us [phone] if you have quesitons about where to send results or other questoins. Otherwise keep taking antiseizure medicatoins the way you are now.

## 2017-11-09 NOTE — PROGRESS NOTES
[facility]      Patient:  Zahra Benjamin  :  1955   Age:  62 year old   Today's Office Visit:  2017    Epilepsy Data:    Seizure Record  Current Visit Date: 17  Previous Visit Date: 17  Months since last visit: 6.05    Seizure Type 1: Simple partial onset followed by impairment of consciousness  Description of Sz Type 1: stare with unilat automatims and stiffening  # of Type 1 Seizure since last visit: 5  Freq. Type 1 / Month: 0.83    History of Present Illness:     Admitted for Sep 9 for series of seizures., three were  witnessed.  Had skipped dose of medications night before, reportedly because staff did not administer.  Nonetheless AED levels were carbamazepine 8.8, lacosamide 2.2, PB 15.    Otherwise staff have not seen any seizures since May. They feel she is much better since carbamazepine started.    No GTCs for many years. Last     Current Outpatient Prescriptions   Medication Sig Dispense Refill     lacosamide (VIMPAT) 150 MG TABS tablet Take 150 mg by mouth At Bedtime       PHENobarbital (LUMINAL) 64.8 MG TABS tablet TAKE 1 TABLET BY MOUTH AT BEDTIME 30 tablet 5     VIMPAT 100 MG TABS tablet TAKE 1 TABLET (100MG) BY MOUTH EVERY MORNING AND 150MG TABLET AT BEDTIME 30 tablet 5     lacosamide (VIMPAT) 150 MG TABS tablet Take 1 tab po hs. (take the Vimpat 100 mg tab as directed) 30 tablet 5     NONFORMULARY CBD Oil       carBAMazepine (TEGRETOL XR) 200 MG 12 hr tablet Increase as directed to 1 tab po am - 1 tab po pm - 2 tab po pm 120 tablet 11     ROSUVASTATIN CALCIUM PO Take 10 mg by mouth daily       Ferrous Sulfate Dried (SLOW IRON PO) Take 50 mg by mouth daily       multivitamin, therapeutic with minerals (THERA-VIT-M) TABS Take 1 tablet by mouth daily.       olanzapine (ZYPREXA) 2.5 MG tablet Take 0.5 tablets by mouth 2 times daily. 30 tablet 0     PANtoprazole (PROTONIX) 40 MG enteric coated tablet Take 1 tablet by mouth 2 times daily. 60 tablet 0      magnesium oxide (MAG-OX) 400 MG tablet Take 400 mg by mouth At Bedtime.       calcium carbonate 750 MG CHEW Take 1,500 mg by mouth daily.       docusate sodium (COLACE) 100 MG capsule Take 100 mg by mouth 2 times daily.       Cyanocobalamin (VITAMIN B-12) 2500 MCG tablet Place 2,500 mcg under the tongue daily.       CALCIUM 600 + D 600-200 MG-UNIT OR TABS 2 times daily  3     ORDER FOR DME knee 1 0     hydrOXYzine (VISTARIL) 25 MG capsule Take 25 mg by mouth       ondansetron (ZOFRAN) 4 MG tablet Take 4-8 mg by mouth       acetaminophen (TYLENOL) 500 MG tablet Take 2 tablets by mouth every 8 hours. 100 tablet      STATIN NOT PRESCRIBED, INTENTIONAL, by Other route continuous prn.  0        Perceived AED Side Effects:  No    Medication Notes:    No side effecst.  Had labs drawn by Babatunde Molina MD at [facility]. [facility]. [phone]. Does not want blood drawn here.  AED Medication Compliance:  compliant all of the time  Using a pill box:  Yes    Review of Systems:  Lethargy / Tiredness:  No  Nausea / Vomiting:  No  Double Vision:  No  Sleepiness:  No  Depression:  No  Slowed Cognitive Function:  No  Memory Problems:  Yes  Poor Balance:  NA; wheel chair bound.  Dizziness:  No  Appetite Changes:  No  Blurred Vision:  No  Decreased Libido:  NA  Sleep Changes:  No  Behavioral Changes:  No  Skin: negative  Respiratory: No shortness of breath, dyspnea on exertion, cough, or hemoptysis and No shortness of breath  Cardiovascular: negative  Have you experienced a traumatic fall since your last visit: NO  Are these falls related to your seizures:  Not Applicable    Other Issues:    No symptoms of TIA, including amaurosis, aphasia, focal weakness or numbness.  On medical cannabis for chronic pain. Gold oil from [facility]. This appears to be THC rich.  Is patient safe to drive:  No    Woman Care:   Patient is:  Postmenopausal.    Exam:    /74 (BP Location: Left arm, Patient  Position: Chair, Cuff Size: Adult Regular)     Wt Readings from Last 5 Encounters:   12/03/11 147 lb 0.2 oz (66.7 kg)   10/08/09 159 lb (72.1 kg)   10/17/06 135 lb (61.2 kg)   08/29/06 135 lb (61.2 kg)   06/22/06 135 lb (61.2 kg)     Heart exam without murmur. RRR. No carotid bruit. Little irritabilitiy; does not appear overtly depressed. Cooperative; much less bitter than several years ago. Language fluent, follows commands, appropriate. In wheel chair with bilateral leg amputations so cannot evaluate gait. Visual fields full. Extraocular movements intact without nystagmus. Smile symmetrical. Facial sensation equal. Tongue midline and strong. No drift, pronation, or tremor. Reflexes difficult to obtain in UE, AKA left, BKA right. Finger finger nose is done well.    We were able to obtain levels drawn recently after patient left clinic and scan into system.    Carbamazepine and phenobarbital levels stable and did not drop during recent ER visit. Lacosamide level however did drop as in table.    Results for MARIAM GUTIERREZ (MRN [id number]) as of 11/9/2017 17:23   Ref. Range 7/6/2004 15:57 8/5/2005 11:09 9/6/2005 13:09 5/17/2006 16:25 10/18/2007 17:15 12/3/2011 12:05 12/6/2011 08:00 3/24/2014 00:00 12/11/2014 13:01 6/5/2015 15:45 11/7/2016 15:03 5/9/2017 13:39 9/4/2017 10:28 11/2/2017 14:32   Lacosamide Latest Ref Range: 15.0 mcg/ml        10.3 11.8 9.6 10.0  2.2 (L) 6.5         Assessment and Plan:   1) Partial seizures, left temporal onset. Seizures appear to have improved significantly following addition of carbamazepine. Still not certain we are detecting all seizures but between consistent caregiver and family observations feel pretty confident we have made progress. The recent worsening appears to have occurred because of missed AED dose. Lacosamide level did in fact drop significantly. This suggests that lacosamide dose was missed. Further suggests that lacosamide is helpful.  2) Severe vitamin D deficiency;  patient refuses Vit D supplementation but is at least allowing MVI.  3) History of significant peripheral vascular disease. Status post left CEA. No evidence of TIA.  4) Probably continues with at least dysthymia. Not major depression and not suicidal. Better than previously, probably because on less phenobarbital.      DISCUSSION  Discussed how aggressive we should be given the fact she is wheel chair bound and not having convulsions. She again feels that things are going fine and does not want more intensive workup or AED manipulations. She feels that current AEDs have helped and is glad that carbamazepine was started.      PLAN:  1) Same phenobarbital, carbamazepine and lacosamide.  2) We again suggested Vit D supplementation and patient again aggressively resisted.  3) Maximizing carbamazepine or lacosamide; substituting OXC for carbamazepine or LTG for PB could be considered if clinically indicated.  4) RTC 6 months.  5) If we are not making progress may need to reimage especially given persistent left temporal slowing and epileptiform activity.      Karthik Curtis

## 2018-05-05 DIAGNOSIS — G40.209 LOCALIZATION-RELATED PARTIAL EPILEPSY WITH COMPLEX PARTIAL SEIZURES (H): Primary | ICD-10-CM

## 2018-05-11 RX ORDER — LACOSAMIDE 150 MG/1
TABLET, FILM COATED ORAL
Qty: 30 TABLET | Refills: 1 | Status: SHIPPED | OUTPATIENT
Start: 2018-05-11 | End: 2018-06-27

## 2018-05-11 NOTE — TELEPHONE ENCOUNTER
Nurse received refill request for: Vimpat 150 mg    Last re-ordered: 11/9/17    Last refill:     Last appointment: 11/9/17    Next appointment: none scheduled    From last clinic notes:    PLAN:  1) Same phenobarbital, carbamazepine and lacosamide.  2) We again suggested Vit D supplementation and patient again aggressively resisted.  3) Maximizing carbamazepine or lacosamide; substituting OXC for carbamazepine or LTG for PB could be considered if clinically indicated.  4) RTC 6 months.  5) If we are not making progress may need to reimage especially given persistent left temporal slowing and epileptiform activity.      Pharmacy:  [facility] [place]    Action sent two Mo supply along with Note Patient must set return appointment for future refills

## 2018-05-14 ENCOUNTER — TELEPHONE (OUTPATIENT)
Dept: NEUROLOGY | Facility: CLINIC | Age: 63
End: 2018-05-14

## 2018-05-14 DIAGNOSIS — G40.009 BENIGN FOCAL EPILEPSY OF CHILDHOOD (H): Primary | ICD-10-CM

## 2018-05-14 DIAGNOSIS — G40.909 EPILEPSY (H): ICD-10-CM

## 2018-05-14 NOTE — TELEPHONE ENCOUNTER
"Nurse received In-Basket message as follows:  Caller: Zahra     Relationship to Patient: self     Call Back Number: [phone]     Reason for Call: would like to know if she needs blood draw prior to her appt     Nurse returned call to 1st call number - her mobile phone - got message \"Voice mail box not set up\".  Called second number - Home number and got message \" Mail box not set up\".    Will attempt call again at another time.      "

## 2018-05-14 NOTE — TELEPHONE ENCOUNTER
Received call from Patient asking about lab before appointment.  Called x 1 and received message that VN box was not set up.  Placed second call later and reached Patient who indicates ask about labs before her next appointment. Nurse indicated that this might be helpful to MD to have this information ahead of appointment, but that it was not absolutely necessary.   Patient ask nurse to speak with care giver who is present.  Care giver indicates that Patient prefers to have blood draws at her primary clinic as there is someone there that she prefers to have do her blood draws.    Nurse indicated that he would send labs to clinic, but noted that her appointment is not until 6/27/18, and labs should be drawn close to appointment time.   Caregiver acknowledges this and indicates that she will help get blood draws close to appointment day.   No further questions at present time.

## 2018-05-15 ENCOUNTER — TELEPHONE (OUTPATIENT)
Dept: NEUROLOGY | Facility: CLINIC | Age: 63
End: 2018-05-15

## 2018-06-11 DIAGNOSIS — G40.209 LOCALIZATION-RELATED PARTIAL EPILEPSY WITH COMPLEX PARTIAL SEIZURES (H): ICD-10-CM

## 2018-06-11 DIAGNOSIS — G40.109 SYMPTOMATIC LOCALIZATION-RELATED EPILEPSY (H): ICD-10-CM

## 2018-06-11 RX ORDER — LACOSAMIDE 100 MG/1
TABLET, FILM COATED ORAL
Qty: 31 TABLET | Refills: 3 | Status: SHIPPED | OUTPATIENT
Start: 2018-06-11 | End: 2018-06-27

## 2018-06-11 RX ORDER — PHENOBARBITAL 64.8 MG/1
TABLET ORAL
Qty: 31 TABLET | Refills: 1 | Status: SHIPPED | OUTPATIENT
Start: 2018-06-11 | End: 2018-06-27

## 2018-06-21 ENCOUNTER — TRANSFERRED RECORDS (OUTPATIENT)
Dept: HEALTH INFORMATION MANAGEMENT | Facility: CLINIC | Age: 63
End: 2018-06-21

## 2018-06-27 ENCOUNTER — OFFICE VISIT (OUTPATIENT)
Dept: NEUROLOGY | Facility: CLINIC | Age: 63
End: 2018-06-27
Payer: COMMERCIAL

## 2018-06-27 VITALS — DIASTOLIC BLOOD PRESSURE: 67 MMHG | SYSTOLIC BLOOD PRESSURE: 153 MMHG | HEART RATE: 65 BPM | TEMPERATURE: 96.2 F

## 2018-06-27 DIAGNOSIS — G40.109 SYMPTOMATIC LOCALIZATION-RELATED EPILEPSY (H): ICD-10-CM

## 2018-06-27 DIAGNOSIS — G40.209 LOCALIZATION-RELATED PARTIAL EPILEPSY WITH COMPLEX PARTIAL SEIZURES (H): ICD-10-CM

## 2018-06-27 RX ORDER — CARBAMAZEPINE 200 MG/1
TABLET, EXTENDED RELEASE ORAL
Qty: 120 TABLET | Refills: 11 | Status: SHIPPED | OUTPATIENT
Start: 2018-06-27 | End: 2019-01-31

## 2018-06-27 RX ORDER — LACOSAMIDE 150 MG/1
TABLET ORAL
Qty: 31 TABLET | Refills: 5 | Status: SHIPPED | OUTPATIENT
Start: 2018-06-27 | End: 2020-05-14

## 2018-06-27 RX ORDER — LACOSAMIDE 100 MG/1
TABLET ORAL
Qty: 31 TABLET | Refills: 5 | Status: SHIPPED | OUTPATIENT
Start: 2018-06-27 | End: 2018-12-20

## 2018-06-27 RX ORDER — PHENOBARBITAL 64.8 MG/1
64.8 TABLET ORAL AT BEDTIME
Qty: 31 TABLET | Refills: 5 | Status: SHIPPED | OUTPATIENT
Start: 2018-06-27 | End: 2018-12-20

## 2018-06-27 ASSESSMENT — PAIN SCALES - GENERAL: PAINLEVEL: NO PAIN (0)

## 2018-06-27 NOTE — MR AVS SNAPSHOT
After Visit Summary   6/27/2018    Zahra Benjamin    MRN: [id number]           Patient Information     Date Of Birth          1955        Visit Information        Provider Department      6/27/2018 3:00 PM Karthik Curtis MD 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      Who to contact     Please call your clinic at [phone] to:    Ask questions about your health    Make or cancel appointments    Discuss your medicines    Learn about your test results    Speak to your doctor            Additional Information About Your Visit        Care EveryWhere ID     This is your Care EveryWhere ID. This could be used by other organizations to access your [facility] medical records  [id number]        Your Vitals Were     Pulse Temperature                65 96.2  F (35.7  C)           Blood Pressure from Last 3 Encounters:   06/27/18 153/67   11/09/17 142/74   09/04/17 107/65    Weight from Last 3 Encounters:   12/03/11 147 lb 0.2 oz (66.7 kg)   10/08/09 159 lb (72.1 kg)   10/17/06 135 lb (61.2 kg)              Today, you had the following     No orders found for display         Today's Medication Changes          These changes are accurate as of 6/27/18  6:09 PM.  If you have any questions, ask your nurse or doctor.               These medicines have changed or have updated prescriptions.        Dose/Directions    PHENobarbital 64.8 MG Tabs tablet   Commonly known as:  LUMINAL   This may have changed:  See the new instructions.   Used for:  Symptomatic localization-related epilepsy (H)   Changed by:  Karthik Curtis MD        Dose:  64.8 mg   Take 1 tablet (64.8 mg) by mouth At Bedtime   Quantity:  31 tablet   Refills:  5             Where to get your medicines      These medications were sent to [facility] - [place], [place] - [address]   [address] [address] , [facility] [address]     Phone:  [phone]     carBAMazepine 200 MG 12 hr tablet         Some of these will need a paper prescription and others can be bought over the counter.  Ask your nurse if you have questions.     Bring a paper prescription for each of these medications     Lacosamide 100 MG Tabs tablet    lacosamide 150 MG Tabs tablet    PHENobarbital 64.8 MG Tabs tablet                Primary Care Provider Office Phone # Fax #    Babatunde Molina PA-C [phone]       [facility] [address]  [facility] [address]        Equal Access to Services     HERMINIO GRIMES : Hadii dalia yusuf hadasho Soomaali, waaxda luqadaha, qaybta kaalmada adeegyada, waxay anain melisa rothman. So [facility] [phone].    ATENCIÓN: Si habla español, tiene a candelario disposición servicios gratuitos de asistencia lingüística. [facility] [phone].    We comply with applicable federal civil rights laws and [place] laws. We do not discriminate on the basis of race, color, national origin, age, disability, sex, sexual orientation, or gender identity.            Thank you!     Thank you for choosing [facility]  for your care. Our goal is always to provide you with excellent care. Hearing back from our patients is one way we can continue to improve our services. Please take a few minutes to complete the written survey that you may receive in the mail after your visit with us. Thank you!             Your Updated Medication List - Protect others around you: Learn how to safely use, store and throw away your medicines at www.disposemymeds.org.          This list is accurate as of 6/27/18  6:09 PM.  Always use your most recent med list.                   Brand Name Dispense Instructions for use Diagnosis    acetaminophen 500 MG tablet    TYLENOL    100  tablet    Take 2 tablets by mouth every 8 hours.    PVD (peripheral vascular disease) (H)       CALCIUM 600 + D 600-200 MG-UNIT Tabs      2 times daily        calcium carbonate 750 MG Chew      Take 1,500 mg by mouth daily.        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        docusate sodium 100 MG capsule    COLACE     Take 100 mg by mouth 2 times daily.        hydrOXYzine 25 MG capsule    VISTARIL     Take 25 mg by mouth        magnesium oxide 400 MG tablet    MAG-OX     Take 400 mg by mouth At Bedtime.        multivitamin, therapeutic with minerals Tabs tablet      Take 1 tablet by mouth daily.    Iron deficiency anemia, unspecified       NONFORMULARY      CBD Oil        OLANZapine 2.5 MG tablet    zyPREXA    30 tablet    Take 0.5 tablets by mouth 2 times daily.    Anxiety       ondansetron 4 MG tablet    ZOFRAN     Take 4-8 mg by mouth        * order for DME     1    knee        pantoprazole 40 MG EC tablet    PROTONIX    60 tablet    Take 1 tablet by mouth 2 times daily.    Hemorrhage of gastrointestinal tract, unspecified       PHENobarbital 64.8 MG Tabs tablet    LUMINAL    31 tablet    Take 1 tablet (64.8 mg) by mouth At Bedtime    Symptomatic localization-related epilepsy (H)       ROSUVASTATIN CALCIUM PO      Take 10 mg by mouth daily        SLOW IRON PO      Take 50 mg by mouth daily    Unspecified epilepsy without mention of intractable epilepsy       * STATIN NOT PRESCRIBED (INTENTIONAL)      by Other route continuous prn.    CARDIOVASCULAR SCREENING; LDL GOAL LESS THAN 100, Peripheral vascular disease, unspecified       * VIMPAT 150 MG Tabs tablet   Generic drug:  lacosamide      Take 150 mg by mouth At Bedtime        * lacosamide  150 MG Tabs tablet    VIMPAT    30 tablet    Take 1 tab po hs. 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 Read the directions carefully, and ask your doctor or other care provider to review them with you.

## 2018-06-27 NOTE — PROGRESS NOTES
[facility] Epilepsy Care Progress Note      Patient:  Zahra Benjamin  :  1955   Age:  63 year old   Today's Office Visit:  2018    Epilepsy Data:    Seizure Record  Current Visit Date: 18  Previous Visit Date: 17  Months since last visit: 7.56    Seizure Type 1: Simple partial onset followed by impairment of consciousness  Description of Sz Type 1: stare with unilat automatims and stiffening  # of Type 1 Seizure since last visit: 0  Freq. Type 1 / Month: 0    Background History:  Left temporal CPS. Well controlled w /d, /d, CBZ 1000/d while followed at [facility] between  and . She refused simplification of regimen. 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 AEDs changed to PB 60/d and /d w recurrent sz. Referred here where Vimpat increased to 350/d w persistent seizures. Initially refused another AED but relented after ambulatory EEG found daily seizures. We restarted carbamazepine increasing to 800 mg with improvement. Has refused tapering of PB. Sig vit D deficiency, refused supplementation.    History of Present Illness:     No seizures since last visit.  Her caregivers have not noted any either.     Current Outpatient Prescriptions   Medication Sig Dispense Refill     acetaminophen (TYLENOL) 500 MG tablet Take 2 tablets by mouth every 8 hours. 100 tablet      CALCIUM 600 + D 600-200 MG-UNIT OR TABS 2 times daily  3     calcium carbonate 750 MG CHEW Take 1,500 mg by mouth daily.       carBAMazepine (TEGRETOL XR) 200 MG 12 hr tablet Increase as directed to 1 tab po am - 1 tab po pm - 2 tab po pm 120 tablet 11     Cyanocobalamin (VITAMIN B-12) 2500 MCG tablet Place 2,500 mcg under the tongue daily.       docusate sodium (COLACE) 100 MG capsule Take 100 mg by mouth 2 times daily.       Ferrous Sulfate Dried (SLOW IRON PO) Take 50 mg by mouth daily       hydrOXYzine (VISTARIL) 25 MG capsule Take 25 mg by mouth        lacosamide (VIMPAT) 150 MG TABS tablet Take 150 mg by mouth At Bedtime       lacosamide (VIMPAT) 150 MG TABS tablet Take 1 tab po hs. (take the Vimpat 100 mg tab as directed) 30 tablet 5     magnesium oxide (MAG-OX) 400 MG tablet Take 400 mg by mouth At Bedtime.       multivitamin, therapeutic with minerals (THERA-VIT-M) TABS Take 1 tablet by mouth daily.       NONFORMULARY CBD Oil       olanzapine (ZYPREXA) 2.5 MG tablet Take 0.5 tablets by mouth 2 times daily. 30 tablet 0     ondansetron (ZOFRAN) 4 MG tablet Take 4-8 mg by mouth       ORDER FOR DME knee 1 0     PANtoprazole (PROTONIX) 40 MG enteric coated tablet Take 1 tablet by mouth 2 times daily. 60 tablet 0     PHENobarbital (LUMINAL) 64.8 MG TABS tablet TAKE 1 TABLET BY MOUTH AT BEDTIME 31 tablet 1     ROSUVASTATIN CALCIUM PO Take 10 mg by mouth daily       STATIN NOT PRESCRIBED, INTENTIONAL, by Other route continuous prn.  0     VIMPAT 100 MG TABS tablet TAKE 1 TABLET (100 MG) BY MOUTH EVERY MORNING 31 tablet 3     VIMPAT 150 MG TABS tablet TAKE 1 TABLET (VIMPAT 150 MG) BY MOUTH EVERYDAY AT BEDTIME 30 tablet 1        Perceived AED Side Effects:  No    Medication Notes:    Had levels drawn in local clinic. We called to obtain results.  AED levels obtained 6/20/2018   Carbamazepine free 2.1, epoxide 2.5. Apparently total not obtained.  PB and lacosamide levels not back yet.      AED Medication Compliance:  compliant all of the time  Using a pill box:  Medications are administered to patient.    Review of Systems:  Denies headaches. Denies loss of vision. Denies double vision. Denies dysphagia. Denies cough, wheezing, hemoptysis. Denies chest pain, leg swelling. Denies significant weight change, abdominal pain, nausea, vomiting, change in bowel habits, blood per rectum. Denies dysuria, hematuria, flank pain, or kidney stones.   Have you experienced a traumatic fall since your last visit: NO  Are these falls related to your seizures:  Not Applicable    Other  Issues:    No other health troubles. Denies symptoms of TIA including aphasia, speech deficit, amaurosis fugax, focal weakness or numbness, facial weakness or numbness.    PHQ-2 Score:     PHQ-2 ( 1999 [employer]) 6/27/2018 11/9/2017   Q1: Little interest or pleasure in doing things 0 0   Q2: Feeling down, depressed or hopeless 0 0   PHQ-2 Score 0 0     Denies depression or suicidality.    Is patient safe to drive:  No    Woman Care:   Patient is:  Postmenopausal.    Exam:    /67 (BP Location: Left arm, Patient Position: Chair, Cuff Size: Adult Regular)  Pulse 65  Temp 96.2  F (35.7  C)     Wt Readings from Last 5 Encounters:   12/03/11 147 lb 0.2 oz (66.7 kg)   10/08/09 159 lb (72.1 kg)   10/17/06 135 lb (61.2 kg)   08/29/06 135 lb (61.2 kg)   06/22/06 135 lb (61.2 kg)     Heart exam without murmur. RRR. No carotid bruit. No irritability, she is pleasant and much more interactive than in the past. Cooperative; much less bitter than several years ago. Language fluent, follows commands, appropriate. In wheel chair with bilateral leg amputations so cannot evaluate gait. Visual fields full. Extraocular movements intact without nystagmus. Smile symmetrical. Facial sensation equal. Tongue midline and strong. No drift, pronation, or tremor. Reflexes difficult to obtain in UE, AKA left, BKA right. Finger finger nose is done well.    Assessment and Plan:   1) Partial seizures, left temporal onset. Seizures appear to have improved significantly following addition of carbamazepine. Still not certain we are detecting all seizures but between consistent caregiver and family observations feel pretty confident we have made progress.2) Severe vitamin D deficiency; patient refuses Vit D supplementation but is at least allowing MVI.  3) History of significant peripheral vascular disease. Status post left CEA. No evidence of TIA.  4) Mood quite good today,       PLAN:  1) Same phenobarbital, carbamazepine and lacosamide.  2) We  again suggested Vit D supplementation and patient again aggressively resisted.  3) Maximizing carbamazepine or lacosamide; substituting OXC for carbamazepine or LTG for PB could be considered if clinically indicated.  4) RTC 6 months.  5) If seizures worsen, consider reimaging especially given persistent left temporal slowing and epileptiform activity.      Karthik Curtis

## 2018-06-27 NOTE — LETTER
2018     RE: Zahra Benjamin  : 1955   MRN: [id number]      Dear Colleague,    Thank you for referring your patient, Zahra Benjamin, to the [facility] EPILEPSY CARE at [facility]. Please see a copy of my visit note below.    [facility] Epilepsy Care Progress Note      Patient:  Zahra Benjamin  :  1955   Age:  63 year old   Today's Office Visit:  2018    Epilepsy Data:    Seizure Record  Current Visit Date: 18  Previous Visit Date: 17  Months since last visit: 7.56    Seizure Type 1: Simple partial onset followed by impairment of consciousness  Description of Sz Type 1: stare with unilat automatims and stiffening  # of Type 1 Seizure since last visit: 0  Freq. Type 1 / Month: 0    Background History:  Left temporal CPS. Well controlled w /d, /d, CBZ 1000/d while followed at [facility] between  and . She refused simplification of regimen. 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 AEDs changed to PB 60/d and /d w recurrent sz. Referred here where Vimpat increased to 350/d w persistent seizures. Initially refused another AED but relented after ambulatory EEG found daily seizures. We restarted carbamazepine increasing to 800 mg with improvement. Has refused tapering of PB. Sig vit D deficiency, refused supplementation.    History of Present Illness:     No seizures since last visit.  Her caregivers have not noted any either.     Current Outpatient Prescriptions   Medication Sig Dispense Refill     acetaminophen (TYLENOL) 500 MG tablet Take 2 tablets by mouth every 8 hours. 100 tablet      CALCIUM 600 + D 600-200 MG-UNIT OR TABS 2 times daily  3     calcium carbonate 750 MG CHEW Take 1,500 mg by mouth daily.       carBAMazepine (TEGRETOL XR) 200 MG 12 hr tablet Increase as directed to 1 tab po am - 1 tab po pm - 2 tab po pm 120 tablet 11     Cyanocobalamin (VITAMIN B-12) 2500  MCG tablet Place 2,500 mcg under the tongue daily.       docusate sodium (COLACE) 100 MG capsule Take 100 mg by mouth 2 times daily.       Ferrous Sulfate Dried (SLOW IRON PO) Take 50 mg by mouth daily       hydrOXYzine (VISTARIL) 25 MG capsule Take 25 mg by mouth       lacosamide (VIMPAT) 150 MG TABS tablet Take 150 mg by mouth At Bedtime       lacosamide (VIMPAT) 150 MG TABS tablet Take 1 tab po hs. (take the Vimpat 100 mg tab as directed) 30 tablet 5     magnesium oxide (MAG-OX) 400 MG tablet Take 400 mg by mouth At Bedtime.       multivitamin, therapeutic with minerals (THERA-VIT-M) TABS Take 1 tablet by mouth daily.       NONFORMULARY CBD Oil       olanzapine (ZYPREXA) 2.5 MG tablet Take 0.5 tablets by mouth 2 times daily. 30 tablet 0     ondansetron (ZOFRAN) 4 MG tablet Take 4-8 mg by mouth       ORDER FOR DME knee 1 0     PANtoprazole (PROTONIX) 40 MG enteric coated tablet Take 1 tablet by mouth 2 times daily. 60 tablet 0     PHENobarbital (LUMINAL) 64.8 MG TABS tablet TAKE 1 TABLET BY MOUTH AT BEDTIME 31 tablet 1     ROSUVASTATIN CALCIUM PO Take 10 mg by mouth daily       STATIN NOT PRESCRIBED, INTENTIONAL, by Other route continuous prn.  0     VIMPAT 100 MG TABS tablet TAKE 1 TABLET (100 MG) BY MOUTH EVERY MORNING 31 tablet 3     VIMPAT 150 MG TABS tablet TAKE 1 TABLET (VIMPAT 150 MG) BY MOUTH EVERYDAY AT BEDTIME 30 tablet 1        Perceived AED Side Effects:  No    Medication Notes:    Had levels drawn in local clinic. We called to obtain results.  AED levels obtained 6/20/2018   Carbamazepine free 2.1, epoxide 2.5. Apparently total not obtained.  PB and lacosamide levels not back yet.      AED Medication Compliance:  compliant all of the time  Using a pill box:  Medications are administered to patient.    Review of Systems:  Denies headaches. Denies loss of vision. Denies double vision. Denies dysphagia. Denies cough, wheezing, hemoptysis. Denies chest pain, leg swelling. Denies significant weight change,  abdominal pain, nausea, vomiting, change in bowel habits, blood per rectum. Denies dysuria, hematuria, flank pain, or kidney stones.   Have you experienced a traumatic fall since your last visit: NO  Are these falls related to your seizures:  Not Applicable    Other Issues:    No other health troubles. Denies symptoms of TIA including aphasia, speech deficit, amaurosis fugax, focal weakness or numbness, facial weakness or numbness.    PHQ-2 Score:     PHQ-2 ( 1999 Pfizer) 6/27/2018 11/9/2017   Q1: Little interest or pleasure in doing things 0 0   Q2: Feeling down, depressed or hopeless 0 0   PHQ-2 Score 0 0     Denies depression or suicidality.    Is patient safe to drive:  No    Woman Care:   Patient is:  Postmenopausal.    Exam:    /67 (BP Location: Left arm, Patient Position: Chair, Cuff Size: Adult Regular)  Pulse 65  Temp 96.2  F (35.7  C)     Wt Readings from Last 5 Encounters:   12/03/11 147 lb 0.2 oz (66.7 kg)   10/08/09 159 lb (72.1 kg)   10/17/06 135 lb (61.2 kg)   08/29/06 135 lb (61.2 kg)   06/22/06 135 lb (61.2 kg)     Heart exam without murmur. RRR. No carotid bruit. No irritability, she is pleasant and much more interactive than in the past. Cooperative; much less bitter than several years ago. Language fluent, follows commands, appropriate. In wheel chair with bilateral leg amputations so cannot evaluate gait. Visual fields full. Extraocular movements intact without nystagmus. Smile symmetrical. Facial sensation equal. Tongue midline and strong. No drift, pronation, or tremor. Reflexes difficult to obtain in UE, AKA left, BKA right. Finger finger nose is done well.    Assessment and Plan:   1) Partial seizures, left temporal onset. Seizures appear to have improved significantly following addition of carbamazepine. Still not certain we are detecting all seizures but between consistent caregiver and family observations feel pretty confident we have made progress.2) Severe vitamin D deficiency;  patient refuses Vit D supplementation but is at least allowing MVI.  3) History of significant peripheral vascular disease. Status post left CEA. No evidence of TIA.  4) Mood quite good today,       PLAN:  1) Same phenobarbital, carbamazepine and lacosamide.  2) We again suggested Vit D supplementation and patient again aggressively resisted.  3) Maximizing carbamazepine or lacosamide; substituting OXC for carbamazepine or LTG for PB could be considered if clinically indicated.  4) RTC 6 months.  5) If seizures worsen, consider reimaging especially given persistent left temporal slowing and epileptiform activity.      Again, thank you for allowing me to participate in the care of your patient.      Sincerely,    Karthik Curtis MD

## 2018-06-29 LAB
CARBAM 10, 11 EPOXIDE LEVEL: 2.5 (ref 0.1–1)
CARBAM 10, 11 EPOXIDE LEVEL: 2.5 (ref 0.1–1)
CARBAMAZEPINE FREE: 2.1 MCG/ML (ref 1–3)
CARBAMAZEPINE FREE: 2.1 MCG/ML (ref 1–3)
CARBAMAZEPINE TOTAL: 8.4 MG/L (ref 4–12)
CARBAMAZEPINE TOTAL: 8.4 MG/L (ref 4–12)
LACOSAMIDE: 8.1 MCG/ML (ref ?–15)
Lab: 15 MCG/ML (ref 10–30)
Lab: 6.2 MCG/ML
Lab: 8.8 MCG/ML (ref 7.5–20)

## 2018-07-10 DIAGNOSIS — G40.909 EPILEPSY (H): ICD-10-CM

## 2018-12-20 DIAGNOSIS — G40.209 LOCALIZATION-RELATED PARTIAL EPILEPSY WITH COMPLEX PARTIAL SEIZURES (H): ICD-10-CM

## 2018-12-20 DIAGNOSIS — G40.109 SYMPTOMATIC LOCALIZATION-RELATED EPILEPSY (H): ICD-10-CM

## 2019-01-09 DIAGNOSIS — G40.219 PARTIAL EPILEPSY, WITH IMPAIRMENT OF CONSCIOUSNESS, WITH INTRACTABLE EPILEPSY (H): Primary | ICD-10-CM

## 2019-01-09 RX ORDER — LACOSAMIDE 100 MG/1
TABLET ORAL
Qty: 31 TABLET | Refills: 5 | Status: SHIPPED | OUTPATIENT
Start: 2019-01-09 | End: 2019-01-11

## 2019-01-09 RX ORDER — PHENOBARBITAL 64.8 MG/1
TABLET ORAL
Qty: 31 TABLET | Refills: 5 | Status: SHIPPED | OUTPATIENT
Start: 2019-01-09 | End: 2019-01-10

## 2019-01-10 DIAGNOSIS — G40.109 SYMPTOMATIC LOCALIZATION-RELATED EPILEPSY (H): ICD-10-CM

## 2019-01-10 DIAGNOSIS — G40.209 LOCALIZATION-RELATED PARTIAL EPILEPSY WITH COMPLEX PARTIAL SEIZURES (H): Primary | ICD-10-CM

## 2019-01-10 NOTE — TELEPHONE ENCOUNTER
"Unable to locate both rx from 1/9 due to it being printed at the [facility].   \"  Report: [facility] SCHED2 MEDS FOR PRINT OUT      \"    Both will need to be redone in the SLP department.     Orders for Vimpat and Phenobarbital routed to Dr. Coker for signature.    "

## 2019-01-11 RX ORDER — LACOSAMIDE 100 MG/1
TABLET ORAL
Qty: 31 TABLET | Refills: 5 | Status: SHIPPED | OUTPATIENT
Start: 2019-01-11 | End: 2019-01-31

## 2019-01-11 RX ORDER — PHENOBARBITAL 64.8 MG/1
TABLET ORAL
Qty: 31 TABLET | Refills: 5 | Status: SHIPPED | OUTPATIENT
Start: 2019-01-11 | End: 2019-01-31

## 2019-01-16 ENCOUNTER — TRANSFERRED RECORDS (OUTPATIENT)
Dept: NEUROLOGY | Facility: CLINIC | Age: 64
End: 2019-01-16

## 2019-01-16 DIAGNOSIS — G40.219 PARTIAL EPILEPSY, WITH IMPAIRMENT OF CONSCIOUSNESS, WITH INTRACTABLE EPILEPSY (H): ICD-10-CM

## 2019-01-16 LAB
CARBAM 10, 11 EPOXIDE LEVEL: 3 (ref 0.2–2)
LACOSAMIDE: 5.4 UG/ML (ref 0–15)
PHENOBARBITAL SERUM: 12.7 MCG/ML (ref 15–40)

## 2019-01-21 DIAGNOSIS — G40.219 PARTIAL SYMPTOMATIC EPILEPSY WITH COMPLEX PARTIAL SEIZURES, INTRACTABLE, WITHOUT STATUS EPILEPTICUS (H): Primary | ICD-10-CM

## 2019-01-29 RX ORDER — LACOSAMIDE 150 MG/1
TABLET, FILM COATED ORAL
Qty: 31 TABLET | Refills: 5 | Status: SHIPPED | OUTPATIENT
Start: 2019-01-29 | End: 2019-01-31

## 2019-01-31 ENCOUNTER — OFFICE VISIT (OUTPATIENT)
Dept: NEUROLOGY | Facility: CLINIC | Age: 64
End: 2019-01-31
Payer: COMMERCIAL

## 2019-01-31 VITALS — SYSTOLIC BLOOD PRESSURE: 142 MMHG | DIASTOLIC BLOOD PRESSURE: 64 MMHG | RESPIRATION RATE: 16 BRPM

## 2019-01-31 DIAGNOSIS — G40.219 PARTIAL SYMPTOMATIC EPILEPSY WITH COMPLEX PARTIAL SEIZURES, INTRACTABLE, WITHOUT STATUS EPILEPTICUS (H): ICD-10-CM

## 2019-01-31 DIAGNOSIS — G40.109 SYMPTOMATIC LOCALIZATION-RELATED EPILEPSY (H): ICD-10-CM

## 2019-01-31 DIAGNOSIS — G40.209 LOCALIZATION-RELATED PARTIAL EPILEPSY WITH COMPLEX PARTIAL SEIZURES (H): ICD-10-CM

## 2019-01-31 RX ORDER — LACOSAMIDE 150 MG/1
TABLET ORAL
Qty: 31 TABLET | Refills: 5 | Status: SHIPPED | OUTPATIENT
Start: 2019-01-31 | End: 2020-05-14

## 2019-01-31 RX ORDER — CARBAMAZEPINE 200 MG/1
TABLET, EXTENDED RELEASE ORAL
Qty: 1204 TABLET | Refills: 11 | Status: SHIPPED | OUTPATIENT
Start: 2019-01-31 | End: 2020-01-02

## 2019-01-31 RX ORDER — PHENOBARBITAL 64.8 MG/1
TABLET ORAL
Qty: 31 TABLET | Refills: 11 | Status: SHIPPED | OUTPATIENT
Start: 2019-01-31 | End: 2019-08-10

## 2019-01-31 RX ORDER — LACOSAMIDE 100 MG/1
TABLET ORAL
Qty: 31 TABLET | Refills: 5 | Status: SHIPPED | OUTPATIENT
Start: 2019-01-31 | End: 2019-08-10

## 2019-01-31 ASSESSMENT — PAIN SCALES - GENERAL: PAINLEVEL: NO PAIN (0)

## 2019-01-31 NOTE — PROGRESS NOTES
[facility]      Patient:  Zahra Benjamin  :  1955   Age:  63 year old   Today's Office Visit:  2019    Epilepsy Data:    Seizure Record  Current Visit Date: 19  Previous Visit Date: 18  Months since last visit: 7.16  Seizure Type 1: Simple partial onset followed by impairment of consciousness  Description of Sz Type 1: stare with unilat automatims and stiffening  # of Type 1 Seizure since last visit: 0  Freq. Type 1 / Month: 0    History of Present Illness:     No seizures. Last 2017 or so. Seizures stopped after carbamazepine added to lacosamide and phenobarbital. Maxmizing lacosamide did not control seizures. She has resisted simplification of AED regimen.    Current Outpatient Medications   Medication Sig Dispense Refill     acetaminophen (TYLENOL) 500 MG tablet Take 2 tablets by mouth every 8 hours. 100 tablet      CALCIUM 600 + D 600-200 MG-UNIT OR TABS 2 times daily  3     calcium carbonate 750 MG CHEW Take 1,500 mg by mouth daily.       carBAMazepine (TEGRETOL XR) 200 MG 12 hr tablet Increase as directed to 1 tab po am - 1 tab po pm - 2 tab po pm 120 tablet 11     Cyanocobalamin (VITAMIN B-12) 2500 MCG tablet Place 2,500 mcg under the tongue daily.       docusate sodium (COLACE) 100 MG capsule Take 100 mg by mouth 2 times daily.       Ferrous Sulfate Dried (SLOW IRON PO) Take 50 mg by mouth daily       hydrOXYzine (VISTARIL) 25 MG capsule Take 25 mg by mouth       Lacosamide (VIMPAT) 100 MG TABS tablet TAKE 1 TABLET BY MOUTH EVERY MORNING 31 tablet 5     lacosamide (VIMPAT) 150 MG TABS tablet TAKE 1 TABLET (VIMPAT 150 MG) BY MOUTH EVERYDAY AT BEDTIME 31 tablet 5     lacosamide (VIMPAT) 150 MG TABS tablet Take 150 mg by mouth At Bedtime       lacosamide (VIMPAT) 150 MG TABS tablet Take 1 tab po hs. (take the Vimpat 100 mg tab as directed) 30 tablet 5     magnesium oxide (MAG-OX) 400 MG tablet Take 400 mg by mouth At Bedtime.       multivitamin,  "therapeutic with minerals (THERA-VIT-M) TABS Take 1 tablet by mouth daily.       NONFORMULARY CBD Oil       olanzapine (ZYPREXA) 2.5 MG tablet Take 0.5 tablets by mouth 2 times daily. 30 tablet 0     ondansetron (ZOFRAN) 4 MG tablet Take 4-8 mg by mouth       ORDER FOR DME knee 1 0     PANtoprazole (PROTONIX) 40 MG enteric coated tablet Take 1 tablet by mouth 2 times daily. 60 tablet 0     PHENobarbital (LUMINAL) 64.8 MG tablet TAKE 1 TABLET (64.8MG) BY MOUTH AT BEDTIME 31 tablet 5     ROSUVASTATIN CALCIUM PO Take 10 mg by mouth daily       STATIN NOT PRESCRIBED, INTENTIONAL, by Other route continuous prn.  0     VIMPAT 150 MG TABS tablet TAKE 1 TABLET (150MG) BY MOUTH EVERY DAY AT BEDTIME 31 tablet 5     acetaminophen-codeine (TYLENOL #4) 300-60 MG per tablet Take 1 tablet by mouth          Perceived AED Side Effects:  No    Medication Notes:    Taking lacosamide 100-150, carbamazepine -200-400, PB 64.3 at bedtime.  AEDs obtained from blister pack which she brought with her today. AED levels were done in clinic.   [phone]; Ask for \"the clinic, Zoe or Natalie to track these down\".  AED Medication Compliance:  compliant all of the time  Using a pill box:  Uses packed system.    Review of Systems:  Denies headaches. Denies loss of vision. Denies double vision. Denies dysphagia. Denies cough, wheezing, hemoptysis. Denies chest pain, leg swelling. Denies significant weight change, abdominal pain, nausea, vomiting, change in bowel habits, blood per rectum. Denies dysuria, hematuria, flank pain, or kidney stones.  Have you experienced a traumatic fall since your last visit: NO  Are these falls related to your seizures:  Not Applicable    Other Issues:    No TIA symptoms including amaurosis, stroke symptoms, or chest pain.    PHQ-2 Score:     PHQ-2 ( 1999 Pfizer) 1/31/2019 6/27/2018   Q1: Little interest or pleasure in doing things 0 0   Q2: Feeling down, depressed or hopeless 0 0   PHQ-2 Score 0 0     Is " patient safe to drive:  No    Woman Care:   Patient is:  Postmenopausal.     Exam:    /64   Resp 16      Wt Readings from Last 5 Encounters:   12/03/11 147 lb 0.2 oz (66.7 kg)   10/08/09 159 lb (72.1 kg)   10/17/06 135 lb (61.2 kg)   08/29/06 135 lb (61.2 kg)   06/22/06 135 lb (61.2 kg)     Heart exam without murmur. RRR. No carotid bruit. No irritability, she is pleasant and interactive today. Cooperative; much less bitter than several years ago. Language fluent, follows commands, appropriate. In wheel chair with bilateral leg amputations so cannot evaluate gait. Visual fields full. Extraocular movements intact without nystagmus. Smile symmetrical. Facial sensation equal. Tongue midline and strong. No drift, pronation, or tremor. Reflexes difficult to obtain in UE, AKA left, BKA right. Finger finger nose is done well.     Assessment and Plan:   1) Partial seizures, left temporal onset. Seizures appear to have improved significantly following addition of carbamazepine. Tho cannot be certain we are detecting all seizures, she does spend most time with family and caregivers and they are quite certain that no seizures have occurred.   2) Severe vitamin D deficiency; patient refuses Vit D supplementation but is at least allowing MVI.  3) History of significant peripheral vascular disease. Status post left CEA. No evidence of TIA, CAD.  4) Mood continues improved compared to several years past, perhaps because less phenobarbital.      PLAN:  1) Same phenobarbital, carbamazepine and lacosamide.  2) We again suggested Vit D supplementation and patient again aggressively resisted.  3) Maximizing carbamazepine or lacosamide; substituting OXC for carbamazepine or LTG for PB could be considered if clinically indicated. If seizures stay controlled we can cosider tapering either PB or lacosamide.  4) RTC one year. Call if any seizures.  5) If seizures worsen, consider reimaging especially given persistent left temporal  slowing and epileptiform activity.      Karthik Curtis

## 2019-01-31 NOTE — PATIENT INSTRUCTIONS
Preventive Care:    Breast Cancer Screening: During our visit today, we discussed that it is recommended you receive breast cancer screening. Please call or make an appointment with your primary care provider to discuss this with them. You may also call the [facility] scheduling line ([phone]) to set up a mammography appointment at the [facility] within the [facility].

## 2019-01-31 NOTE — LETTER
2019       RE: Zahra Benjamin  : 1955   MRN: [id number]      Dear Colleague,    Thank you for referring your patient, Zahra Benjamin, to the [facility] EPILEPSY CARE at [facility]. Please see a copy of my visit note below.    [facility]      Patient:  Zahra Benjamin  :  1955   Age:  63 year old   Today's Office Visit:  2019    Epilepsy Data:    Seizure Record  Current Visit Date: 19  Previous Visit Date: 18  Months since last visit: 7.16  Seizure Type 1: Simple partial onset followed by impairment of consciousness  Description of Sz Type 1: stare with unilat automatims and stiffening  # of Type 1 Seizure since last visit: 0  Freq. Type 1 / Month: 0    History of Present Illness:     No seizures. Last 2017 or so. Seizures stopped after carbamazepine added to lacosamide and phenobarbital. Maxmizing lacosamide did not control seizures. She has resisted simplification of AED regimen.    Current Outpatient Medications   Medication Sig Dispense Refill     acetaminophen (TYLENOL) 500 MG tablet Take 2 tablets by mouth every 8 hours. 100 tablet      CALCIUM 600 + D 600-200 MG-UNIT OR TABS 2 times daily  3     calcium carbonate 750 MG CHEW Take 1,500 mg by mouth daily.       carBAMazepine (TEGRETOL XR) 200 MG 12 hr tablet Increase as directed to 1 tab po am - 1 tab po pm - 2 tab po pm 120 tablet 11     Cyanocobalamin (VITAMIN B-12) 2500 MCG tablet Place 2,500 mcg under the tongue daily.       docusate sodium (COLACE) 100 MG capsule Take 100 mg by mouth 2 times daily.       Ferrous Sulfate Dried (SLOW IRON PO) Take 50 mg by mouth daily       hydrOXYzine (VISTARIL) 25 MG capsule Take 25 mg by mouth       Lacosamide (VIMPAT) 100 MG TABS tablet TAKE 1 TABLET BY MOUTH EVERY MORNING 31 tablet 5     lacosamide (VIMPAT) 150 MG TABS tablet TAKE 1 TABLET (VIMPAT 150 MG) BY MOUTH EVERYDAY AT BEDTIME 31 tablet 5     lacosamide (VIMPAT)  "150 MG TABS tablet Take 150 mg by mouth At Bedtime       lacosamide (VIMPAT) 150 MG TABS tablet Take 1 tab po hs. (take the Vimpat 100 mg tab as directed) 30 tablet 5     magnesium oxide (MAG-OX) 400 MG tablet Take 400 mg by mouth At Bedtime.       multivitamin, therapeutic with minerals (THERA-VIT-M) TABS Take 1 tablet by mouth daily.       NONFORMULARY CBD Oil       olanzapine (ZYPREXA) 2.5 MG tablet Take 0.5 tablets by mouth 2 times daily. 30 tablet 0     ondansetron (ZOFRAN) 4 MG tablet Take 4-8 mg by mouth       ORDER FOR DME knee 1 0     PANtoprazole (PROTONIX) 40 MG enteric coated tablet Take 1 tablet by mouth 2 times daily. 60 tablet 0     PHENobarbital (LUMINAL) 64.8 MG tablet TAKE 1 TABLET (64.8MG) BY MOUTH AT BEDTIME 31 tablet 5     ROSUVASTATIN CALCIUM PO Take 10 mg by mouth daily       STATIN NOT PRESCRIBED, INTENTIONAL, by Other route continuous prn.  0     VIMPAT 150 MG TABS tablet TAKE 1 TABLET (150MG) BY MOUTH EVERY DAY AT BEDTIME 31 tablet 5     acetaminophen-codeine (TYLENOL #4) 300-60 MG per tablet Take 1 tablet by mouth          Perceived AED Side Effects:  No    Medication Notes:    Taking lacosamide 100-150, carbamazepine -200-400, PB 64.3 at bedtime.  AEDs obtained from blister pack which she brought with her today. AED levels were done in clinic.   [phone]; Ask for \"the clinic, Zoe or Natalie to track these down\".  AED Medication Compliance:  compliant all of the time  Using a pill box:  Uses packed system.    Review of Systems:  Denies headaches. Denies loss of vision. Denies double vision. Denies dysphagia. Denies cough, wheezing, hemoptysis. Denies chest pain, leg swelling. Denies significant weight change, abdominal pain, nausea, vomiting, change in bowel habits, blood per rectum. Denies dysuria, hematuria, flank pain, or kidney stones.  Have you experienced a traumatic fall since your last visit: NO  Are these falls related to your seizures:  Not Applicable    Other Issues:  "   No TIA symptoms including amaurosis, stroke symptoms, or chest pain.    PHQ-2 Score:     PHQ-2 ( 1999 Pfizer) 1/31/2019 6/27/2018   Q1: Little interest or pleasure in doing things 0 0   Q2: Feeling down, depressed or hopeless 0 0   PHQ-2 Score 0 0     Is patient safe to drive:  No    Woman Care:   Patient is:  Postmenopausal.     Exam:    /64   Resp 16      Wt Readings from Last 5 Encounters:   12/03/11 147 lb 0.2 oz (66.7 kg)   10/08/09 159 lb (72.1 kg)   10/17/06 135 lb (61.2 kg)   08/29/06 135 lb (61.2 kg)   06/22/06 135 lb (61.2 kg)     Heart exam without murmur. RRR. No carotid bruit. No irritability, she is pleasant and interactive today. Cooperative; much less bitter than several years ago. Language fluent, follows commands, appropriate. In wheel chair with bilateral leg amputations so cannot evaluate gait. Visual fields full. Extraocular movements intact without nystagmus. Smile symmetrical. Facial sensation equal. Tongue midline and strong. No drift, pronation, or tremor. Reflexes difficult to obtain in UE, AKA left, BKA right. Finger finger nose is done well.     Assessment and Plan:   1) Partial seizures, left temporal onset. Seizures appear to have improved significantly following addition of carbamazepine. Tho cannot be certain we are detecting all seizures, she does spend most time with family and caregivers and they are quite certain that no seizures have occurred.   2) Severe vitamin D deficiency; patient refuses Vit D supplementation but is at least allowing MVI.  3) History of significant peripheral vascular disease. Status post left CEA. No evidence of TIA, CAD.  4) Mood  continues improved compared to several years past, perhaps because less phenobarbital.      PLAN:  1) Same phenobarbital, carbamazepine and lacosamide.  2) We again suggested Vit D supplementation and patient again aggressively resisted.  3) Maximizing carbamazepine or lacosamide; substituting OXC for carbamazepine or  LTG for PB could be considered if clinically indicated. If seizures stay controlled we can cosider tapering either PB or lacosamide.  4) RTC one year. Call if any seizures.  5) If seizures worsen, consider reimaging especially given persistent left temporal slowing and epileptiform activity.      Karthik Curtis

## 2019-08-10 DIAGNOSIS — G40.109 SYMPTOMATIC LOCALIZATION-RELATED EPILEPSY (H): ICD-10-CM

## 2019-08-10 DIAGNOSIS — G40.209 LOCALIZATION-RELATED PARTIAL EPILEPSY WITH COMPLEX PARTIAL SEIZURES (H): Primary | ICD-10-CM

## 2019-08-12 RX ORDER — PHENOBARBITAL 64.8 MG/1
TABLET ORAL
Qty: 31 TABLET | Refills: 5 | Status: SHIPPED | OUTPATIENT
Start: 2019-08-12 | End: 2019-08-14

## 2019-08-12 RX ORDER — LACOSAMIDE 100 MG/1
TABLET ORAL
Qty: 31 TABLET | Refills: 5 | Status: SHIPPED | OUTPATIENT
Start: 2019-08-12 | End: 2019-08-14

## 2019-08-12 RX ORDER — LACOSAMIDE 150 MG/1
TABLET, FILM COATED ORAL
Qty: 31 TABLET | Refills: 5 | Status: SHIPPED | OUTPATIENT
Start: 2019-08-12 | End: 2019-08-14

## 2019-08-14 DIAGNOSIS — G40.109 SYMPTOMATIC LOCALIZATION-RELATED EPILEPSY (H): Primary | ICD-10-CM

## 2019-08-14 DIAGNOSIS — G40.209 LOCALIZATION-RELATED PARTIAL EPILEPSY WITH COMPLEX PARTIAL SEIZURES (H): ICD-10-CM

## 2019-08-27 RX ORDER — PHENOBARBITAL 64.8 MG/1
TABLET ORAL
Qty: 31 TABLET | Refills: 5 | Status: SHIPPED | OUTPATIENT
Start: 2019-08-27 | End: 2020-01-02

## 2019-08-27 RX ORDER — LACOSAMIDE 150 MG/1
TABLET, FILM COATED ORAL
Qty: 31 TABLET | Refills: 5 | Status: SHIPPED | OUTPATIENT
Start: 2019-08-27 | End: 2020-01-02

## 2019-08-27 RX ORDER — LACOSAMIDE 100 MG/1
TABLET ORAL
Qty: 31 TABLET | Refills: 5 | Status: SHIPPED | OUTPATIENT
Start: 2019-08-27 | End: 2020-01-02

## 2020-01-02 ENCOUNTER — OFFICE VISIT (OUTPATIENT)
Dept: NEUROLOGY | Facility: CLINIC | Age: 65
End: 2020-01-02
Payer: MEDICARE

## 2020-01-02 VITALS — DIASTOLIC BLOOD PRESSURE: 72 MMHG | HEART RATE: 74 BPM | SYSTOLIC BLOOD PRESSURE: 149 MMHG

## 2020-01-02 DIAGNOSIS — G40.209 LOCALIZATION-RELATED PARTIAL EPILEPSY WITH COMPLEX PARTIAL SEIZURES (H): ICD-10-CM

## 2020-01-02 DIAGNOSIS — G40.109 SYMPTOMATIC LOCALIZATION-RELATED EPILEPSY (H): ICD-10-CM

## 2020-01-02 RX ORDER — PHENOBARBITAL 64.8 MG/1
64.8 TABLET ORAL AT BEDTIME
Qty: 31 TABLET | Refills: 5 | Status: SHIPPED | OUTPATIENT
Start: 2020-01-02 | End: 2020-07-15

## 2020-01-02 RX ORDER — LACOSAMIDE 100 MG/1
TABLET ORAL
Qty: 31 TABLET | Refills: 5 | Status: SHIPPED | OUTPATIENT
Start: 2020-01-02 | End: 2020-09-03

## 2020-01-02 RX ORDER — LACOSAMIDE 150 MG/1
TABLET ORAL
Qty: 31 TABLET | Refills: 5 | Status: SHIPPED | OUTPATIENT
Start: 2020-01-02 | End: 2020-07-15

## 2020-01-02 RX ORDER — CARBAMAZEPINE 100 MG/1
TABLET, CHEWABLE ORAL
COMMUNITY
Start: 2019-09-16 | End: 2020-05-14

## 2020-01-02 RX ORDER — CARBAMAZEPINE 200 MG/1
TABLET, EXTENDED RELEASE ORAL
Qty: 124 TABLET | Refills: 3 | Status: SHIPPED | OUTPATIENT
Start: 2020-01-02 | End: 2020-05-14

## 2020-01-02 ASSESSMENT — PAIN SCALES - GENERAL: PAINLEVEL: NO PAIN (0)

## 2020-01-02 NOTE — PATIENT INSTRUCTIONS
Talk to your daughter about coming off some of the medications.  We're not sure you need the vimpat and your seizrues would probably remain controlled on the carbamazepine and the phenobarbital.    If your primary care doctor will refill your medications we do not need to see you.  If you need to have antiseizure medications refilled by us, we will need to see you once per year.

## 2020-01-02 NOTE — LETTER
2020     RE: Zahra Benjamin  : 1955   MRN: [id number]      Dear Colleague,    Thank you for referring your patient, Zahra Benjamin, [place] [facility] at [facility]. Please see a copy of my visit note below.    [facility] [place] [facility] Progress Note      Patient:  Zahra Benjamin  :  1955   Age:  64 year old   Today's Office Visit:  2020    Epilepsy Data:    Seizure Record  Current Visit Date: 20  Previous Visit Date: 19  Months since last visit: 11.04    Seizure Type 1: Simple partial onset followed by impairment of consciousness  Description of Sz Type 1: stare with unilat automatims and stiffening  # of Type 1 Seizure since last visit: 0  Freq. Type 1 / Month: 0  # of Type 2 Seizure since last visit: 0    Freq. Type 2 / Month: 0  Description of Sz Type 3: 0      History of Present Illness:     No seizures since last seen. Last seizure . Carbamazepine added at that time.  She had focal impaired and secondarily generalized tonic clonic seizures.  PCA spends about 12 hours per day with her and has not noted spells consistent with seizures.    Current Outpatient Medications   Medication Sig Dispense Refill     acetaminophen (TYLENOL) 500 MG tablet Take 2 tablets by mouth every 8 hours. 100 tablet      acetaminophen-codeine (TYLENOL #4) 300-60 MG per tablet Take 1 tablet by mouth       carBAMazepine (TEGRETOL XR) 200 MG 12 hr tablet Increase as directed to 1 tab po am - 1 tab po pm - 2 tab po pm 1204 tablet 11     carBAMazepine (TEGRETOL) 100 MG chewable tablet 1 tablet by mouth twice daily, and 2 tablets at bed time. Total 4 tablets daily       Cyanocobalamin (VITAMIN B-12) 2500 MCG tablet Place 2,500 mcg under the tongue daily.       Lacosamide (VIMPAT) 100 MG TABS tablet TAKE 1 TABLET BY MOUTH EVERY MORNING. 31 tablet 5     lacosamide (VIMPAT) 150 MG TABS tablet Take 150 mg by mouth At Bedtime       lacosamide (VIMPAT) 150 MG TABS  tablet Take 1 tab po hs. (take the Vimpat 100 mg tab as directed) 30 tablet 5     magnesium oxide (MAG-OX) 400 MG tablet Take 400 mg by mouth At Bedtime.       multivitamin, therapeutic with minerals (THERA-VIT-M) TABS Take 1 tablet by mouth daily.       NONFORMULARY CBD Oil       ondansetron (ZOFRAN) 4 MG tablet Take 4-8 mg by mouth       PANtoprazole (PROTONIX) 40 MG enteric coated tablet Take 1 tablet by mouth 2 times daily. 60 tablet 0     PHENobarbital (LUMINAL) 64.8 MG tablet TAKE 1 TABLET BY MOUTH AT BEDTIME. 31 tablet 5     ROSUVASTATIN CALCIUM PO Take 10 mg by mouth daily       CALCIUM 600 + D 600-200 MG-UNIT OR TABS 2 times daily  3     calcium carbonate 750 MG CHEW Take 1,500 mg by mouth daily.       docusate sodium (COLACE) 100 MG capsule Take 100 mg by mouth 2 times daily.       Ferrous Sulfate Dried (SLOW IRON PO) Take 50 mg by mouth daily       hydrOXYzine (VISTARIL) 25 MG capsule Take 25 mg by mouth       lacosamide (VIMPAT) 150 MG TABS tablet TAKE 1 TABLET (150MG) BY MOUTH EVERY DAY AT BEDTIME 31 tablet 5     lacosamide (VIMPAT) 150 MG TABS tablet TAKE 1 TABLET (VIMPAT 150 MG) BY MOUTH EVERYDAY AT BEDTIME 31 tablet 5     olanzapine (ZYPREXA) 2.5 MG tablet Take 0.5 tablets by mouth 2 times daily. 30 tablet 0     ORDER FOR DME knee 1 0     STATIN NOT PRESCRIBED, INTENTIONAL, by Other route continuous prn.  0     VIMPAT 150 MG TABS tablet TAKE 1 TABLET BY MOUTH EVERY DAY AT BEDTIME. 31 tablet 5        Perceived AED Side Effects:  No    Medication Notes:    Duloxetine 20 mg per day. Olanzapine 2.5 mg per day.    AED Medication Compliance:  compliant all of the time  Using a pill box:  Uses blister pack she brought with her today.    Review of Systems:  Denies headaches. Denies loss of vision. Denies double vision. Denies dysphagia. Denies cough, wheezing, hemoptysis. Denies chest pain, leg swelling. Denies significant weight change, abdominal pain, nausea, vomiting, change in bowel habits, blood per  rectum. Denies dysuria, hematuria, flank pain, or kidney stones.  Have you experienced a traumatic fall since your last visit: NO  Are these falls related to your seizures:  Not Applicable      Other Issues:      No opther health troubles. Denies depression. PHQ 2 today = 0.  She denies symptoms suggesting TIA including amaurosis, focal weakness, focal numbness, speech deficits, diplopia, dysarthria, ataxia.  Is patient safe to drive:  No    Woman Care:   Patient is:  Postmenopausal.    Exam:    BP (!) 149/72 (BP Location: Right arm, Patient Position: Sitting, Cuff Size: Adult Regular)   Pulse 74      Wt Readings from Last 5 Encounters:   12/03/11 147 lb 0.2 oz (66.7 kg)   10/08/09 159 lb (72.1 kg)   10/17/06 135 lb (61.2 kg)   08/29/06 135 lb (61.2 kg)   06/22/06 135 lb (61.2 kg)     Heart exam without murmur. RRR. No carotid bruit. No irritability, she is pleasant and interactive today. Cooperative; much less bitter than several years ago. Language fluent, follows commands, appropriate. In wheel chair with bilateral leg amputations so cannot evaluate gait. Visual fields full to confrontation. Extraocular movements intact without nystagmus. Smile symmetrical. Facial sensation equal. Tongue midline and strong. No drift, pronation, or tremor. Reflexes difficult to obtain in UE, AKA left, BKA right. Finger finger nose is done well. There is minor resting tremor in right arm only, also present with extension. Tone is normal in arms and blink rate is normal as well. Voice is strong without tremor.     Assessment and Plan:   1) Partial seizures, left temporal onset. Seizures appear to have improved significantly following addition of carbamazepine.  Under observation most of the day so probably not having seizures. Not clear she requires lacosamide.   2) Severe vitamin D deficiency; patient refuses Vit D supplementation but is at least allowing MVI.  3) History of significant peripheral vascular disease. Status post  left CEA. No evidence of TIA, CAD.  4) Mood continues improved compared to several years past, perhaps because less phenobarbital or because of more aggressive psychotropic treatment.    DISCUSSION  Again discussed tapering and discontinuation of lacosamide. She seemed more willing today than previously but did not want to make any moves without her daughter's consent and daughter was not present today. Wrote out issues for her in AVS and asked her to share with daughter. DAughter had been opposed to any changes in the past.      PLAN:  1) Same phenobarbital, carbamazepine and lacosamide. Rx approved for 6 to 12 months as possible.  2) We again suggested Vit D supplementation and patient again aggressively resisted.  3) Maximizing carbamazepine or lacosamide; substituting OXC for carbamazepine or LTG for PB could be considered if clinically indicated. If seizures stay controlled we can cosider tapering either PB or lacosamide provided they agree.  4) RTC one year. Call if any seizures. Reviewed symptoms of TIA and asked to present to ED if these occur.  5) If seizures worsen, consider reimaging especially given persistent left temporal slowing and epileptiform activity.      Karthik Curtis

## 2020-01-02 NOTE — PROGRESS NOTES
[facility] [place] [facility]      Patient:  Zahra Benjamin  :  1955   Age:  64 year old   Today's Office Visit:  2020    Epilepsy Data:    Seizure Record  Current Visit Date: 20  Previous Visit Date: 19  Months since last visit: 11.04    Seizure Type 1: Simple partial onset followed by impairment of consciousness  Description of Sz Type 1: stare with unilat automatims and stiffening  # of Type 1 Seizure since last visit: 0  Freq. Type 1 / Month: 0  # of Type 2 Seizure since last visit: 0    Freq. Type 2 / Month: 0  Description of Sz Type 3: 0      History of Present Illness:     No seizures since last seen. Last seizure . Carbamazepine added at that time.  She had focal impaired and secondarily generalized tonic clonic seizures.  PCA spends about 12 hours per day with her and has not noted spells consistent with seizures.    Current Outpatient Medications   Medication Sig Dispense Refill     acetaminophen (TYLENOL) 500 MG tablet Take 2 tablets by mouth every 8 hours. 100 tablet      acetaminophen-codeine (TYLENOL #4) 300-60 MG per tablet Take 1 tablet by mouth       carBAMazepine (TEGRETOL XR) 200 MG 12 hr tablet Increase as directed to 1 tab po am - 1 tab po pm - 2 tab po pm 1204 tablet 11     carBAMazepine (TEGRETOL) 100 MG chewable tablet 1 tablet by mouth twice daily, and 2 tablets at bed time. Total 4 tablets daily       Cyanocobalamin (VITAMIN B-12) 2500 MCG tablet Place 2,500 mcg under the tongue daily.       Lacosamide (VIMPAT) 100 MG TABS tablet TAKE 1 TABLET BY MOUTH EVERY MORNING. 31 tablet 5     lacosamide (VIMPAT) 150 MG TABS tablet Take 150 mg by mouth At Bedtime       lacosamide (VIMPAT) 150 MG TABS tablet Take 1 tab po hs. (take the Vimpat 100 mg tab as directed) 30 tablet 5     magnesium oxide (MAG-OX) 400 MG tablet Take 400 mg by mouth At Bedtime.       multivitamin, therapeutic with minerals (THERA-VIT-M) TABS Take 1 tablet by mouth daily.       NONFORMULARY CBD  Oil       ondansetron (ZOFRAN) 4 MG tablet Take 4-8 mg by mouth       PANtoprazole (PROTONIX) 40 MG enteric coated tablet Take 1 tablet by mouth 2 times daily. 60 tablet 0     PHENobarbital (LUMINAL) 64.8 MG tablet TAKE 1 TABLET BY MOUTH AT BEDTIME. 31 tablet 5     ROSUVASTATIN CALCIUM PO Take 10 mg by mouth daily       CALCIUM 600 + D 600-200 MG-UNIT OR TABS 2 times daily  3     calcium carbonate 750 MG CHEW Take 1,500 mg by mouth daily.       docusate sodium (COLACE) 100 MG capsule Take 100 mg by mouth 2 times daily.       Ferrous Sulfate Dried (SLOW IRON PO) Take 50 mg by mouth daily       hydrOXYzine (VISTARIL) 25 MG capsule Take 25 mg by mouth       lacosamide (VIMPAT) 150 MG TABS tablet TAKE 1 TABLET (150MG) BY MOUTH EVERY DAY AT BEDTIME 31 tablet 5     lacosamide (VIMPAT) 150 MG TABS tablet TAKE 1 TABLET (VIMPAT 150 MG) BY MOUTH EVERYDAY AT BEDTIME 31 tablet 5     olanzapine (ZYPREXA) 2.5 MG tablet Take 0.5 tablets by mouth 2 times daily. 30 tablet 0     ORDER FOR DME knee 1 0     STATIN NOT PRESCRIBED, INTENTIONAL, by Other route continuous prn.  0     VIMPAT 150 MG TABS tablet TAKE 1 TABLET BY MOUTH EVERY DAY AT BEDTIME. 31 tablet 5        Perceived AED Side Effects:  No    Medication Notes:    Duloxetine 20 mg per day. Olanzapine 2.5 mg per day.    AED Medication Compliance:  compliant all of the time  Using a pill box:  Uses blister pack she brought with her today.    Review of Systems:  Denies headaches. Denies loss of vision. Denies double vision. Denies dysphagia. Denies cough, wheezing, hemoptysis. Denies chest pain, leg swelling. Denies significant weight change, abdominal pain, nausea, vomiting, change in bowel habits, blood per rectum. Denies dysuria, hematuria, flank pain, or kidney stones.  Have you experienced a traumatic fall since your last visit: NO  Are these falls related to your seizures:  Not Applicable      Other Issues:      No opther health troubles. Denies depression. PHQ 2 today =  0.  She denies symptoms suggesting TIA including amaurosis, focal weakness, focal numbness, speech deficits, diplopia, dysarthria, ataxia.  Is patient safe to drive:  No    Woman Care:   Patient is:  Postmenopausal.    Exam:    BP (!) 149/72 (BP Location: Right arm, Patient Position: Sitting, Cuff Size: Adult Regular)   Pulse 74      Wt Readings from Last 5 Encounters:   12/03/11 147 lb 0.2 oz (66.7 kg)   10/08/09 159 lb (72.1 kg)   10/17/06 135 lb (61.2 kg)   08/29/06 135 lb (61.2 kg)   06/22/06 135 lb (61.2 kg)     Heart exam without murmur. RRR. No carotid bruit. No irritability, she is pleasant and interactive today. Cooperative; much less bitter than several years ago. Language fluent, follows commands, appropriate. In wheel chair with bilateral leg amputations so cannot evaluate gait. Visual fields full to confrontation. Extraocular movements intact without nystagmus. Smile symmetrical. Facial sensation equal. Tongue midline and strong. No drift, pronation, or tremor. Reflexes difficult to obtain in UE, AKA left, BKA right. Finger finger nose is done well. There is minor resting tremor in right arm only, also present with extension. Tone is normal in arms and blink rate is normal as well. Voice is strong without tremor.     Assessment and Plan:   1) Partial seizures, left temporal onset. Seizures appear to have improved significantly following addition of carbamazepine. Under observation most of the day so probably not having seizures. Not clear she requires lacosamide.   2) Severe vitamin D deficiency; patient refuses Vit D supplementation but is at least allowing MVI.  3) History of significant peripheral vascular disease. Status post left CEA. No evidence of TIA, CAD.  4) Mood continues improved compared to several years past, perhaps because less phenobarbital or because of more aggressive psychotropic treatment.    DISCUSSION  Again discussed tapering and discontinuation of lacosamide. She seemed more  willing today than previously but did not want to make any moves without her daughter's consent and daughter was not present today. Wrote out issues for her in AVS and asked her to share with daughter. DAughter had been opposed to any changes in the past.      PLAN:  1) Same phenobarbital, carbamazepine and lacosamide. Rx approved for 6 to 12 months as possible.  2) We again suggested Vit D supplementation and patient again aggressively resisted.  3) Maximizing carbamazepine or lacosamide; substituting OXC for carbamazepine or LTG for PB could be considered if clinically indicated. If seizures stay controlled we can cosider tapering either PB or lacosamide provided they agree.  4) RTC one year. Call if any seizures. Reviewed symptoms of TIA and asked to present to ED if these occur.  5) If seizures worsen, consider reimaging especially given persistent left temporal slowing and epileptiform activity.      Karthik Curtis

## 2020-02-10 NOTE — ED NOTES
"Pt arrives via EMS from home d/t \"seizure activity\". Pt has PCA that visits. Pt reportedly has increased \"seziure activity\", unable to describe to EMS what she saw. Hx seizures, takes tegretol. Denies change in medication, hasn't missed any meds, no recent illness reported. BG WNL VSS. Pt A&O x4. No IV access. ABC intact.   " Consent: The patient's consent was obtained including but not limited to risks of crusting, scabbing, blistering, scarring, darker or lighter pigmentary change, recurrence, incomplete removal and infection. Post-Care Instructions: I reviewed with the patient in detail post-care instructions. Patient is to wear sunprotection, and avoid picking at any of the treated lesions. Pt may apply Vaseline to crusted or scabbing areas. Number Of Freeze-Thaw Cycles: 1 freeze-thaw cycle Render Post-Care Instructions In Note?: no Detail Level: Detailed Duration Of Freeze Thaw-Cycle (Seconds): 0

## 2020-04-14 DIAGNOSIS — G40.109 SYMPTOMATIC LOCALIZATION-RELATED EPILEPSY (H): ICD-10-CM

## 2020-04-14 DIAGNOSIS — G40.209 LOCALIZATION-RELATED PARTIAL EPILEPSY WITH COMPLEX PARTIAL SEIZURES (H): ICD-10-CM

## 2020-04-14 RX ORDER — LACOSAMIDE 100 MG/1
TABLET ORAL
Qty: 31 TABLET | Refills: 4 | OUTPATIENT
Start: 2020-04-14

## 2020-05-11 DIAGNOSIS — G40.209 LOCALIZATION-RELATED PARTIAL EPILEPSY WITH COMPLEX PARTIAL SEIZURES (H): ICD-10-CM

## 2020-05-11 NOTE — LETTER
5/11/2020       RE: Zahra Benjamin  [address] [address]            Dear Zahra Benjamin,     Our records indicate that you are due for a lab draw to check your blood levels of antiseizure drugs, sodium and a blood cell count. [facility] locations for a blood draw after COVID-19 or at your next primary care appointment.       Sincerely,     Hay ROMAN RN  With Dr. Karthik Curtis      CC: Babatunde Molina PA-C at [facility]

## 2020-05-14 RX ORDER — CARBAMAZEPINE 200 MG/1
TABLET, EXTENDED RELEASE ORAL
Qty: 124 TABLET | Refills: 5 | Status: SHIPPED | OUTPATIENT
Start: 2020-05-14 | End: 2020-12-04

## 2020-05-14 NOTE — TELEPHONE ENCOUNTER
carBAMazepine (TEGRETOL XR) 200 MG 12 hr tablet       Last Written Prescription Date:  1-2-20  Last Fill Quantity: 124,   # refills: 3  Last Office Visit : 1-2-20 ( RTC 1 Y)  Future Office visit:  none      Sodium   Date Value Ref Range Status   11/02/2017 140 128 - 145 mmol/L Final     Outside lab: 1-9-19 ( reviewed by provider )  Carbam 10, 11 Epoxide Level  0.2 - 2.0  3.0Abnormal   VC      CBC RESULTS:   Recent Labs   Lab Test 11/02/17  1559 09/04/17  1028   WBC 6.1 9.2   RBC  --  4.42   HGB 13.2 12.7   HCT 41.2 41.0   MCV 91.2 93   MCH 29.3 28.7   MCHC 32.2 31.0*   RDW 17.0* 14.5    207       Routing refill request to provider for review/approval because:  Overdue Lab: Na, CBC

## 2020-07-10 DIAGNOSIS — G40.109 SYMPTOMATIC LOCALIZATION-RELATED EPILEPSY (H): ICD-10-CM

## 2020-07-10 DIAGNOSIS — G40.209 LOCALIZATION-RELATED PARTIAL EPILEPSY WITH COMPLEX PARTIAL SEIZURES (H): ICD-10-CM

## 2020-07-14 NOTE — TELEPHONE ENCOUNTER
VIMPAT    150MG   Last Written Prescription Date:  1/2/20  Last Fill Quantity: 31,   # refills: 5     PHENOBARBITAL 64.8 MG TABLET   Last Written Prescription Date:  1/2/20  Last Fill Quantity: 31,   # refills: 5    Last Office Visit : 1/2/20  Future Office visit:  One year    Routing refill request to provider for review/approval because:  Controlled substances

## 2020-07-15 RX ORDER — PHENOBARBITAL 64.8 MG/1
64.8 TABLET ORAL AT BEDTIME
Qty: 31 TABLET | Refills: 4 | Status: SHIPPED | OUTPATIENT
Start: 2020-07-15 | End: 2020-11-30

## 2020-07-15 RX ORDER — LACOSAMIDE 150 MG/1
TABLET ORAL
Qty: 31 TABLET | Refills: 4 | Status: SHIPPED | OUTPATIENT
Start: 2020-07-15 | End: 2020-12-19

## 2020-09-03 DIAGNOSIS — G40.109 SYMPTOMATIC LOCALIZATION-RELATED EPILEPSY (H): ICD-10-CM

## 2020-09-03 DIAGNOSIS — G40.209 LOCALIZATION-RELATED PARTIAL EPILEPSY WITH COMPLEX PARTIAL SEIZURES (H): ICD-10-CM

## 2020-09-03 NOTE — TELEPHONE ENCOUNTER
VIMPAT    100MG      Last Written Prescription Date:  7/15/2020  Last Fill Quantity: 31,   # refills: 4  Last Office Visit : 1/2/2020  Future Office visit:  None    Routing refill request to provider for review/approval because:  Drug not on the [facility], P or  Health refill protocol or controlled substance      Brie Barakat RN  Central Triage Red Flags/Med Refills

## 2020-09-04 RX ORDER — LACOSAMIDE 100 MG/1
TABLET ORAL
Qty: 31 TABLET | Refills: 5 | Status: SHIPPED | OUTPATIENT
Start: 2020-09-04 | End: 2021-01-27

## 2020-11-28 DIAGNOSIS — G40.209 LOCALIZATION-RELATED PARTIAL EPILEPSY WITH COMPLEX PARTIAL SEIZURES (H): ICD-10-CM

## 2020-11-28 DIAGNOSIS — G40.109 SYMPTOMATIC LOCALIZATION-RELATED EPILEPSY (H): ICD-10-CM

## 2020-11-28 NOTE — TELEPHONE ENCOUNTER
PHENobarbital (LUMINAL) 64.8 MG tablet  Last Written Prescription Date:  7/15/20  Last Fill Quantity: 31,   # refills: 4  Last Office Visit :1/2/20  Future Office visit:  none    Routing refill request to provider for review/approval because: controlled

## 2020-11-30 RX ORDER — PHENOBARBITAL 64.8 MG/1
64.8 TABLET ORAL AT BEDTIME
Qty: 31 TABLET | Refills: 3 | Status: SHIPPED | OUTPATIENT
Start: 2020-11-30 | End: 2021-01-27

## 2020-12-01 DIAGNOSIS — G40.209 LOCALIZATION-RELATED PARTIAL EPILEPSY WITH COMPLEX PARTIAL SEIZURES (H): ICD-10-CM

## 2020-12-04 RX ORDER — CARBAMAZEPINE 200 MG/1
TABLET, EXTENDED RELEASE ORAL
Qty: 124 TABLET | Refills: 3 | Status: SHIPPED | OUTPATIENT
Start: 2020-12-04 | End: 2021-01-27

## 2020-12-04 NOTE — TELEPHONE ENCOUNTER
CARBAMAZEPINE  MG    Last Written Prescription Date:  5/14/2020  Last Fill Quantity: 124,   # refills: 5  Last Office Visit : 1/2/2020  Future Office visit:  None  124 Tabs, 3 Refills sent to micheline Barakat RN  Central Triage Red Flags/Med Refills

## 2020-12-18 ENCOUNTER — TELEPHONE (OUTPATIENT)
Dept: NEUROLOGY | Facility: CLINIC | Age: 65
End: 2020-12-18

## 2020-12-18 NOTE — TELEPHONE ENCOUNTER
I call patient to schedule a follow up  Yearly visit with  in January or February 2021, left message to call back the clinic to schedule.

## 2020-12-19 DIAGNOSIS — G40.109 SYMPTOMATIC LOCALIZATION-RELATED EPILEPSY (H): ICD-10-CM

## 2020-12-19 DIAGNOSIS — G40.209 LOCALIZATION-RELATED PARTIAL EPILEPSY WITH COMPLEX PARTIAL SEIZURES (H): ICD-10-CM

## 2020-12-19 RX ORDER — LACOSAMIDE 150 MG/1
TABLET ORAL
Qty: 31 TABLET | Refills: 5 | Status: SHIPPED | OUTPATIENT
Start: 2020-12-19 | End: 2021-01-27

## 2020-12-19 NOTE — TELEPHONE ENCOUNTER
VIMPAT    150MG      Last Written Prescription Date:  7/15/20  Last Fill Quantity: 31,   # refills: 4  Last Office Visit : 1/2/20 recommended 1 year follow up  Future Office visit:  None scheduled    Routing refill request to provider for review/approval because:  Drug not on the [facility], P or [facility] refill protocol and controlled substance

## 2021-01-27 ENCOUNTER — VIRTUAL VISIT (OUTPATIENT)
Dept: NEUROLOGY | Facility: CLINIC | Age: 66
End: 2021-01-27
Payer: MEDICARE

## 2021-01-27 DIAGNOSIS — G40.209 LOCALIZATION-RELATED PARTIAL EPILEPSY WITH COMPLEX PARTIAL SEIZURES (H): ICD-10-CM

## 2021-01-27 DIAGNOSIS — G40.109 SYMPTOMATIC LOCALIZATION-RELATED EPILEPSY (H): ICD-10-CM

## 2021-01-27 RX ORDER — CARBAMAZEPINE 200 MG/1
TABLET, EXTENDED RELEASE ORAL
Qty: 124 TABLET | Refills: 11 | Status: SHIPPED | OUTPATIENT
Start: 2021-01-27 | End: 2022-02-07

## 2021-01-27 RX ORDER — LACOSAMIDE 100 MG/1
TABLET ORAL
Qty: 31 TABLET | Refills: 5 | Status: SHIPPED | OUTPATIENT
Start: 2021-01-27 | End: 2021-08-26

## 2021-01-27 RX ORDER — PHENOBARBITAL 64.8 MG/1
64.8 TABLET ORAL AT BEDTIME
Qty: 31 TABLET | Refills: 5 | Status: SHIPPED | OUTPATIENT
Start: 2021-01-27 | End: 2021-08-03

## 2021-01-27 RX ORDER — LACOSAMIDE 150 MG/1
TABLET ORAL
Qty: 31 TABLET | Refills: 5 | Status: SHIPPED | OUTPATIENT
Start: 2021-01-27 | End: 2021-08-26

## 2021-01-27 NOTE — LETTER
"2021       RE: Zahra Benjamin  : 1955   MRN: [id number]      Dear Colleague,    Thank you for referring your patient, Zahra Benjamin, to the [facility] EPILEPSY CARE at [facility]. Please see a copy of my visit note below.    Zahra is a 65 year old who is being evaluated via a billable telephone visit.      What phone number would you like to be contacted at? [phone]  How would you like to obtain your AVS? Does not want AVS today     Phone call duration: 22 minutes    [facility]      Patient:  Zahra Benjamin  :  1955   Age:  65 year old   Today's Office Visit:  2021    Epilepsy Data:    Seizure Record  Current Visit Date: 21  Previous Visit Date: 20  Months since last visit: 12.85  Seizure Type 1: Simple partial onset followed by impairment of consciousness  Description of Sz Type 1: stare with unilat automatims and stiffening  # of Type 1 Seizure since last visit: 2  Freq. Type 1 / Month: 0.16  Seizure Type 2: Tonic-clonic seizures  Description of Sz Type 2: convulsion  # of Type 2 Seizure since last visit: 0  Freq. Type 2 / Month: 0  Description of Sz Type 3: 0      History of Present Illness:     She feels that she had one to two small seizures. Cannot tell me when it happened.  Had some impairment. A lot of stress at that time.  Spoke with caregiver who feels that she had two seizures upon awakening \"lasts for four seconds\" some disorientation.    Current Outpatient Medications   Medication Sig Dispense Refill     acetaminophen (TYLENOL) 500 MG tablet Take 2 tablets by mouth every 8 hours. 100 tablet      acetaminophen-codeine (TYLENOL #4) 300-60 MG per tablet Take 1 tablet by mouth       CALCIUM 600 + D 600-200 MG-UNIT OR TABS 2 times daily  3     calcium carbonate 750 MG CHEW Take 1,500 mg by mouth daily.       carBAMazepine (TEGRETOL XR) 200 MG 12 hr tablet TAKE 1 TABLET BY MOUTH EVERY DAY IN THE MORNING, 1 " TABLET IN THE EVENING AND 2 TABLETS AT BEDTIME. 124 tablet 3     Cyanocobalamin (VITAMIN B-12) 2500 MCG tablet Place 2,500 mcg under the tongue daily.       docusate sodium (COLACE) 100 MG capsule Take 100 mg by mouth 2 times daily.       Ferrous Sulfate Dried (SLOW IRON PO) Take 50 mg by mouth daily       hydrOXYzine (VISTARIL) 25 MG capsule Take 25 mg by mouth       Lacosamide (VIMPAT) 100 MG TABS tablet Take one tablet by mouth every morning. 31 tablet 5     lacosamide (VIMPAT) 150 MG TABS tablet TAKE 1 TABLET BY MOUTH EVERY DAY AT BEDTIME. For additional refills, please schedule annual appointment for January 2021 at [phone] 31 tablet 5     magnesium oxide (MAG-OX) 400 MG tablet Take 400 mg by mouth At Bedtime.       multivitamin, therapeutic with minerals (THERA-VIT-M) TABS Take 1 tablet by mouth daily.       NONFORMULARY CBD Oil       olanzapine (ZYPREXA) 2.5 MG tablet Take 0.5 tablets by mouth 2 times daily. 30 tablet 0     ondansetron (ZOFRAN) 4 MG tablet Take 4-8 mg by mouth       PANtoprazole (PROTONIX) 40 MG enteric coated tablet Take 1 tablet by mouth 2 times daily. 60 tablet 0     PHENobarbital (LUMINAL) 64.8 MG tablet TAKE 1 TABLET (64.8 MG) BY MOUTH AT BEDTIME. 31 tablet 3     ROSUVASTATIN CALCIUM PO Take 10 mg by mouth daily       STATIN NOT PRESCRIBED, INTENTIONAL, by Other route continuous prn.  0        Perceived AED Side Effects:  NO diplopia.    Medication Notes:    Carbamazepine (200 mg) 200-200-400  Lacosamide 100 mg in AM and 150 mg in PM  Pb 64.8 mg Takes on eat bedtime.      AED Medication Compliance:  compliant all of the time  Using a pill box:  Yes    Review of Systems:  Have you experienced a traumatic fall since your last visit: NO  Are these falls related to your seizures:  Not Applicable      Other Issues:  No other hospitalizations. No significant depression or anxiety.  Is patient safe to drive:  No    Woman Care:   Patient is:  Postmenopausal.    Exam:    There were no vitals  taken for this visit.     Wt Readings from Last 5 Encounters:   12/03/11 147 lb 0.2 oz (66.7 kg)   10/08/09 159 lb (72.1 kg)   10/17/06 135 lb (61.2 kg)   08/29/06 135 lb (61.2 kg)   06/22/06 135 lb (61.2 kg)     Cannot provide recent weight. Complete not possible because this was a phone visit. Alert and fully oriented. Language fluent. Coherent thought processes. No dysarthria. No evidence of depression or anxiety.    Assessment and Plan:   1) Focal epilepsy; focal seizures, left temporal onset. Seizures appear to have improved significantly following addition of carbamazepine. Not clear whether minor episodes reported are seizures or not. Under observation most of the day so probably not having lengthy seizures. Not clear she requires lacosamide.   2) Severe vitamin D deficiency; patient refuses Vit D supplementation but is at least allowing MVI.  3) History of significant peripheral vascular disease. Status post left CEA. No evidence of TIA, CAD.  4) Mood continues improved compared to several years past, perhaps because less phenobarbital or because of more aggressive psychotropic treatment.     DISCUSSION      PLAN:  1) Same phenobarbital, carbamazepine and lacosamide. Rx approved for 6 to 12 months as possible. We had previously suggested tapering of lacosamide; she was having frequent seizures while taking this medication and it is not clear it has helped her. However current circumstances (Pandemic, phone call) not ideal of this.  2) We again suggested Vit D supplementation and patient again aggressively resisted.  3) Maximizing carbamazepine or lacosamide; substituting OXC for carbamazepine or LTG for PB could be considered if clinically indicated. If seizures stay controlled we can cosider tapering either PB or lacosamide provided they agree.  4) RTC 6 months for in person visit. Call if any seizures. Reviewed symptoms of TIA and asked to present to ED if these occur.  5) If seizures worsen,  consider reimaging especially given persistent left temporal slowing and epileptiform activity.    Total time on phone 22 minutes including about 15 minutes obtaining additional history from caregiver.    Karthik Curtis

## 2021-01-27 NOTE — PROGRESS NOTES
"Zahra is a 65 year old who is being evaluated via a billable telephone visit.      What phone number would you like to be contacted at? [phone]  How would you like to obtain your AVS? Does not want AVS today     Phone call duration: 22 minutes    [facility] Epilepsy [facility] Progress Note      Patient:  Zahra Benjamin  :  1955   Age:  65 year old   Today's Office Visit:  2021    Epilepsy Data:    Seizure Record  Current Visit Date: 21  Previous Visit Date: 20  Months since last visit: 12.85  Seizure Type 1: Simple partial onset followed by impairment of consciousness  Description of Sz Type 1: stare with unilat automatims and stiffening  # of Type 1 Seizure since last visit: 2  Freq. Type 1 / Month: 0.16  Seizure Type 2: Tonic-clonic seizures  Description of Sz Type 2: convulsion  # of Type 2 Seizure since last visit: 0  Freq. Type 2 / Month: 0  Description of Sz Type 3: 0      History of Present Illness:     She feels that she had one to two small seizures. Cannot tell me when it happened.  Had some impairment. A lot of stress at that time.  Spoke with caregiver who feels that she had two seizures upon awakening \"lasts for four seconds\" some disorientation.    Current Outpatient Medications   Medication Sig Dispense Refill     acetaminophen (TYLENOL) 500 MG tablet Take 2 tablets by mouth every 8 hours. 100 tablet      acetaminophen-codeine (TYLENOL #4) 300-60 MG per tablet Take 1 tablet by mouth       CALCIUM 600 + D 600-200 MG-UNIT OR TABS 2 times daily  3     calcium carbonate 750 MG CHEW Take 1,500 mg by mouth daily.       carBAMazepine (TEGRETOL XR) 200 MG 12 hr tablet TAKE 1 TABLET BY MOUTH EVERY DAY IN THE MORNING, 1 TABLET IN THE EVENING AND 2 TABLETS AT BEDTIME. 124 tablet 3     Cyanocobalamin (VITAMIN B-12) 2500 MCG tablet Place 2,500 mcg under the tongue daily.       docusate sodium (COLACE) 100 MG capsule Take 100 mg by mouth 2 times daily.       Ferrous Sulfate Dried (SLOW IRON " PO) Take 50 mg by mouth daily       hydrOXYzine (VISTARIL) 25 MG capsule Take 25 mg by mouth       Lacosamide (VIMPAT) 100 MG TABS tablet Take one tablet by mouth every morning. 31 tablet 5     lacosamide (VIMPAT) 150 MG TABS tablet TAKE 1 TABLET BY MOUTH EVERY DAY AT BEDTIME. For additional refills, please schedule annual appointment for January 2021 at [phone] 31 tablet 5     magnesium oxide (MAG-OX) 400 MG tablet Take 400 mg by mouth At Bedtime.       multivitamin, therapeutic with minerals (THERA-VIT-M) TABS Take 1 tablet by mouth daily.       NONFORMULARY CBD Oil       olanzapine (ZYPREXA) 2.5 MG tablet Take 0.5 tablets by mouth 2 times daily. 30 tablet 0     ondansetron (ZOFRAN) 4 MG tablet Take 4-8 mg by mouth       PANtoprazole (PROTONIX) 40 MG enteric coated tablet Take 1 tablet by mouth 2 times daily. 60 tablet 0     PHENobarbital (LUMINAL) 64.8 MG tablet TAKE 1 TABLET (64.8 MG) BY MOUTH AT BEDTIME. 31 tablet 3     ROSUVASTATIN CALCIUM PO Take 10 mg by mouth daily       STATIN NOT PRESCRIBED, INTENTIONAL, by Other route continuous prn.  0        Perceived AED Side Effects:  NO diplopia.    Medication Notes:    Carbamazepine (200 mg) 200-200-400  Lacosamide 100 mg in AM and 150 mg in PM  Pb 64.8 mg Takes on eat bedtime.      AED Medication Compliance:  compliant all of the time  Using a pill box:  Yes    Review of Systems:  Have you experienced a traumatic fall since your last visit: NO  Are these falls related to your seizures:  Not Applicable      Other Issues:  No other hospitalizations. No significant depression or anxiety.  Is patient safe to drive:  No    Woman Care:   Patient is:  Postmenopausal.    Exam:    There were no vitals taken for this visit.     Wt Readings from Last 5 Encounters:   12/03/11 147 lb 0.2 oz (66.7 kg)   10/08/09 159 lb (72.1 kg)   10/17/06 135 lb (61.2 kg)   08/29/06 135 lb (61.2 kg)   06/22/06 135 lb (61.2 kg)     Cannot provide recent weight. Complete not possible  because this was a phone visit. Alert and fully oriented. Language fluent. Coherent thought processes. No dysarthria. No evidence of depression or anxiety.    Assessment and Plan:   1) Focal epilepsy; focal seizures, left temporal onset. Seizures appear to have improved significantly following addition of carbamazepine. Not clear whether minor episodes reported are seizures or not. Under observation most of the day so probably not having lengthy seizures. Not clear she requires lacosamide.   2) Severe vitamin D deficiency; patient refuses Vit D supplementation but is at least allowing MVI.  3) History of significant peripheral vascular disease. Status post left CEA. No evidence of TIA, CAD.  4) Mood continues improved compared to several years past, perhaps because less phenobarbital or because of more aggressive psychotropic treatment.     DISCUSSION      PLAN:  1) Same phenobarbital, carbamazepine and lacosamide. Rx approved for 6 to 12 months as possible. We had previously suggested tapering of lacosamide; she was having frequent seizures while taking this medication and it is not clear it has helped her. However current circumstances (Pandemic, phone call) not ideal of this.  2) We again suggested Vit D supplementation and patient again aggressively resisted.  3) Maximizing carbamazepine or lacosamide; substituting OXC for carbamazepine or LTG for PB could be considered if clinically indicated. If seizures stay controlled we can cosider tapering either PB or lacosamide provided they agree.  4) RTC 6 months for in person visit. Call if any seizures. Reviewed symptoms of TIA and asked to present to ED if these occur.  5) If seizures worsen, consider reimaging especially given persistent left temporal slowing and epileptiform activity.    Total time on phone 22 minutes including about 15 minutes obtaining additional history from caregiver.    Karthik Curtis

## 2021-04-15 DIAGNOSIS — G40.209 LOCALIZATION-RELATED PARTIAL EPILEPSY WITH COMPLEX PARTIAL SEIZURES (H): ICD-10-CM

## 2021-04-15 DIAGNOSIS — G40.109 SYMPTOMATIC LOCALIZATION-RELATED EPILEPSY (H): ICD-10-CM

## 2021-04-15 RX ORDER — PHENOBARBITAL 64.8 MG/1
64.8 TABLET ORAL AT BEDTIME
Qty: 31 TABLET | Refills: 2 | OUTPATIENT
Start: 2021-04-15

## 2021-04-15 NOTE — TELEPHONE ENCOUNTER
last rx: 1-27-21 for 31 tab w/5 RF- spoke w/pharm  Request sent in error, they have  above rx  on file

## 2021-08-03 DIAGNOSIS — G40.209 LOCALIZATION-RELATED PARTIAL EPILEPSY WITH COMPLEX PARTIAL SEIZURES (H): ICD-10-CM

## 2021-08-03 DIAGNOSIS — G40.109 SYMPTOMATIC LOCALIZATION-RELATED EPILEPSY (H): ICD-10-CM

## 2021-08-03 NOTE — TELEPHONE ENCOUNTER
PHENOBARBITAL 64.8 MG TABLET      Last Written Prescription Date:  1/27/21  Last Fill Quantity: 31,   # refills: 5  Last Office Visit : 1/27/21 recommended 6 month in person follow up  Future Office visit:  None scheduled    Routing refill request to provider for review/approval because:  Drug controlled substance

## 2021-08-09 RX ORDER — PHENOBARBITAL 64.8 MG/1
64.8 TABLET ORAL AT BEDTIME
Qty: 31 TABLET | Refills: 0 | Status: SHIPPED | OUTPATIENT
Start: 2021-08-09 | End: 2021-08-25

## 2021-08-25 ENCOUNTER — VIRTUAL VISIT (OUTPATIENT)
Dept: NEUROLOGY | Facility: CLINIC | Age: 66
End: 2021-08-25
Payer: COMMERCIAL

## 2021-08-25 DIAGNOSIS — G40.109 SYMPTOMATIC LOCALIZATION-RELATED EPILEPSY (H): ICD-10-CM

## 2021-08-25 DIAGNOSIS — G40.209 LOCALIZATION-RELATED PARTIAL EPILEPSY WITH COMPLEX PARTIAL SEIZURES (H): ICD-10-CM

## 2021-08-25 NOTE — PROGRESS NOTES
"Zahra is a 66 year old who is being evaluated via a billable telephone visit.      What phone number would you like to be contacted at? [phone]  How would you like to obtain your AVS? Mail a copy  Phone call duration: 22 minutes    [facility] Epilepsy Care Progress Note      Patient:  Zahra Benjamin  :  1955   Age:  66 year old   Today's Office Visit:  2021    Epilepsy Data:    Seizure Record  Current Visit Date: 21  Previous Visit Date: 21  Months since last visit: 6.9    Seizure Type 1: Simple partial onset followed by impairment of consciousness  Description of Sz Type 1: stare with unilat automatims and stiffening  # of Type 1 Seizure since last visit: 0  Freq. Type 1 / Month: 0  Seizure Type 2: Tonic-clonic seizures  Description of Sz Type 2: convulsion  # of Type 2 Seizure since last visit: 0  Freq. Type 2 / Month: 0  Description of Sz Type 3: 0      History of Present Illness:     She has not had any convulsive seizures. Staff has not noted any seizures or periods of impairment.  Patient continues reporting \"miniseizures\" and feels she has had about 4 since last visit.  During these she reports she stares off briefly \"a few seconds\" but then comes to and continues previous activity.  \"I don't forget what I was doing or what had happened in the show I was watching\".  When others are present, no one notices anything.    Current Outpatient Medications   Medication Sig Dispense Refill     acetaminophen (TYLENOL) 500 MG tablet Take 2 tablets by mouth every 8 hours. 100 tablet      acetaminophen-codeine (TYLENOL #4) 300-60 MG per tablet Take 1 tablet by mouth       carBAMazepine (TEGRETOL XR) 200 MG 12 hr tablet Take one in AM, one in PM and two at bedtime (total 800 mg per day) 124 tablet 11     Cyanocobalamin (VITAMIN B-12) 2500 MCG tablet Place 2,500 mcg under the tongue daily.       docusate sodium (COLACE) 100 MG capsule Take 100 mg by mouth 2 times daily.       " Ferrous Sulfate Dried (SLOW IRON PO) Take 50 mg by mouth daily       hydrOXYzine (VISTARIL) 25 MG capsule Take 25 mg by mouth 4 times daily as needed        Lacosamide (VIMPAT) 100 MG TABS tablet Take one tablet by mouth every morning. 31 tablet 5     lacosamide (VIMPAT) 150 MG TABS tablet TAKE 1 TABLET BY MOUTH EVERY DAY AT BEDTIME. For additional refills, please schedule annual appointment for January 2021 at [phone] 31 tablet 5     magnesium oxide (MAG-OX) 400 MG tablet Take 400 mg by mouth At Bedtime.       multivitamin, therapeutic with minerals (THERA-VIT-M) TABS Take 1 tablet by mouth daily.       NONFORMULARY CBD Oil       olanzapine (ZYPREXA) 2.5 MG tablet Take 0.5 tablets by mouth 2 times daily. 30 tablet 0     ondansetron (ZOFRAN) 4 MG tablet Take 4-8 mg by mouth       PANtoprazole (PROTONIX) 40 MG enteric coated tablet Take 1 tablet by mouth 2 times daily. 60 tablet 0     PHENobarbital (LUMINAL) 64.8 MG tablet Take 1 tablet (64.8 mg) by mouth At Bedtime For additional refills, please schedule a follow-up appointment at [phone] 31 tablet 0     ROSUVASTATIN CALCIUM PO Take 10 mg by mouth daily       CALCIUM 600 + D 600-200 MG-UNIT OR TABS 2 times daily (Patient not taking: No sig reported)  3     calcium carbonate 750 MG CHEW Take 1,500 mg by mouth daily. (Patient not taking: Reported on 8/25/2021)       STATIN NOT PRESCRIBED, INTENTIONAL, by Other route continuous prn. (Patient not taking: Reported on 8/25/2021)  0        Perceived AED Side Effects:  No    Medication Notes:  No side effects.      AED Medication Compliance:  compliant all of the time  Using a pill box:  Medications administred to patient.    Review of Systems:  No vomiting, diarrhea, fevers, hematuria or kidney stones.  Have you experienced a traumatic fall since your last visit: NO  Are these falls related to your seizures:  Not Applicable    Other Issues:    No other health troubles. She does not want to take the COVID  shots.  No TIA or stroke like symptoms.  Is patient safe to drive:  No    Woman Care:   Patient is:  Postmenopausal.    Exam:    There were no vitals taken for this visit.     Wt Readings from Last 5 Encounters:   12/03/11 147 lb 0.2 oz (66.7 kg)   10/08/09 159 lb (72.1 kg)   10/17/06 135 lb (61.2 kg)   08/29/06 135 lb (61.2 kg)   06/22/06 135 lb (61.2 kg)     Complete exam not possible because this was a phone visit. Alert and fully oriented. Language fluent. Coherent thought processes. No dysarthria. No evidence of depression or anxiety.    Assessment and Plan:   1) Focal epilepsy; focal seizures, left temporal onset. Seizures appear to have improved significantly following addition of carbamazepine and are probably completely controlled. Not clear whether minor episodes reported are seizures or not. Under observation most of the day so probably not having lengthy seizures. Not clear she requires lacosamide.   2) Severe vitamin D deficiency; patient refuses Vit D supplementation but is at least allowing MVI.  3) History of significant peripheral vascular disease. Status post left CEA. No evidence of TIA, CAD.  4) Mood continues improved compared to several years past, perhaps because less phenobarbital or because of more aggressive psychotropic treatment.     DISCUSSION:  We discussed our experience with COVID in people with epilepsy and suggested that vaccination was the best course for her. She was not moved and stated that she would not get the vaccine.      PLAN:  1) Same phenobarbital, carbamazepine and lacosamide. Rx approved for 6 to 12 months as possible. We had previously suggested tapering of lacosamide; she was having frequent seizures while taking this medication and it is not clear it has helped her. However I don't think it is appropriate to do this under current circumstances. We have not had opportunity to examine her face to face in over a year and breakthrough requiring admission needs to be  avoided in current circumstances.  2) We again suggested Vit D supplementation and patient again aggressively resisted.  3) Maximizing carbamazepine or lacosamide; substituting OXC for carbamazepine or LTG for PB could be considered if clinically indicated. If seizures stay controlled we can cosider tapering either PB or lacosamide in the future provided she agrees.  4) RTC 6 months for in person visit. Call if any seizures. Reviewed symptoms of TIA and asked to present to ED if these occur.  5) If seizures worsen, consider reimaging especially given persistent left temporal slowing and epileptiform activity.     Total time on phone 22 minutes including about 7 minutes obtaining additional history from caregiver.     Karthik Curtis

## 2021-08-25 NOTE — LETTER
"2021     RE: Zahra Benjamin  : 1955   MRN: [id number]      Dear Colleague,    Thank you for referring your patient, Zahra Benjamin, to the [facility] EPILEPSY CARE at [facility]. Please see a copy of my visit note below.    Zahra is a 66 year old who is being evaluated via a billable telephone visit.      What phone number would you like to be contacted at? [phone]  How would you like to obtain your AVS? Mail a copy  Phone call duration: 22 minutes    [facility] Epilepsy Care Progress Note      Patient:  Zahra Benjamin  :  1955   Age:  66 year old   Today's Office Visit:  2021    Epilepsy Data:    Seizure Record  Current Visit Date: 21  Previous Visit Date: 21  Months since last visit: 6.9    Seizure Type 1: Simple partial onset followed by impairment of consciousness  Description of Sz Type 1: stare with unilat automatims and stiffening  # of Type 1 Seizure since last visit: 0  Freq. Type 1 / Month: 0  Seizure Type 2: Tonic-clonic seizures  Description of Sz Type 2: convulsion  # of Type 2 Seizure since last visit: 0  Freq. Type 2 / Month: 0  Description of Sz Type 3: 0      History of Present Illness:     She has not had any convulsive seizures. Staff has not noted any seizures or periods of impairment.  Patient continues reporting \"miniseizures\" and feels she has had about 4 since last visit.  During these she reports she stares off briefly \"a few seconds\" but then comes to and continues previous activity.  \"I don't forget what I was doing or what had happened in the show I was watching\".  When others are present, no one notices anything.    Current Outpatient Medications   Medication Sig Dispense Refill     acetaminophen (TYLENOL) 500 MG tablet Take 2 tablets by mouth every 8 hours. 100 tablet      acetaminophen-codeine (TYLENOL #4) 300-60 MG per tablet Take 1 tablet by mouth       carBAMazepine (TEGRETOL XR) 200 MG 12 " hr tablet Take one in AM, one in PM and two at bedtime (total 800 mg per day) 124 tablet 11     Cyanocobalamin (VITAMIN B-12) 2500 MCG tablet Place 2,500 mcg under the tongue daily.       docusate sodium (COLACE) 100 MG capsule Take 100 mg by mouth 2 times daily.       Ferrous Sulfate Dried (SLOW IRON PO) Take 50 mg by mouth daily       hydrOXYzine (VISTARIL) 25 MG capsule Take 25 mg by mouth 4 times daily as needed        Lacosamide (VIMPAT) 100 MG TABS tablet Take one tablet by mouth every morning. 31 tablet 5     lacosamide (VIMPAT) 150 MG TABS tablet TAKE 1 TABLET BY MOUTH EVERY DAY AT BEDTIME. For additional refills, please schedule annual appointment for January 2021 at [phone] 31 tablet 5     magnesium oxide (MAG-OX) 400 MG tablet Take 400 mg by mouth At Bedtime.       multivitamin, therapeutic with minerals (THERA-VIT-M) TABS Take 1 tablet by mouth daily.       NONFORMULARY CBD Oil       olanzapine (ZYPREXA) 2.5 MG tablet Take 0.5 tablets by mouth 2 times daily. 30 tablet 0     ondansetron (ZOFRAN) 4 MG tablet Take 4-8 mg by mouth       PANtoprazole (PROTONIX) 40 MG enteric coated tablet Take 1 tablet by mouth 2 times daily. 60 tablet 0     PHENobarbital (LUMINAL) 64.8 MG tablet Take 1 tablet (64.8 mg) by mouth At Bedtime For additional refills, please schedule a follow-up appointment at [phone] 31 tablet 0     ROSUVASTATIN CALCIUM PO Take 10 mg by mouth daily       CALCIUM 600 + D 600-200 MG-UNIT OR TABS 2 times daily (Patient not taking: No sig reported)  3     calcium carbonate 750 MG CHEW Take 1,500 mg by mouth daily. (Patient not taking: Reported on 8/25/2021)       STATIN NOT PRESCRIBED, INTENTIONAL, by Other route continuous prn. (Patient not taking: Reported on 8/25/2021)  0        Perceived AED Side Effects:  No    Medication Notes:  No side effects.      AED Medication Compliance:  compliant all of the time  Using a pill box:  Medications administred to patient.    Review of Systems:  No  vomiting, diarrhea, fevers, hematuria or kidney stones.  Have you experienced a traumatic fall since your last visit: NO  Are these falls related to your seizures:  Not Applicable    Other Issues:    No other health troubles. She does not want to take the COVID shots.  No TIA or stroke like symptoms.  Is patient safe to drive:  No    Woman Care:   Patient is:  Postmenopausal.    Exam:    There were no vitals taken for this visit.     Wt Readings from Last 5 Encounters:   12/03/11 147 lb 0.2 oz (66.7 kg)   10/08/09 159 lb (72.1 kg)   10/17/06 135 lb (61.2 kg)   08/29/06 135 lb (61.2 kg)   06/22/06 135 lb (61.2 kg)     Complete exam not possible because this was a phone visit. Alert and fully oriented. Language fluent. Coherent thought processes. No dysarthria. No evidence of depression or anxiety.    Assessment and Plan:   1) Focal epilepsy; focal seizures, left temporal onset. Seizures appear to have improved significantly following addition of carbamazepine and are probably completely controlled. Not clear whether minor episodes reported are seizures or not. Under observation most of the day so probably not having lengthy seizures. Not clear she requires lacosamide.   2) Severe vitamin D deficiency; patient refuses Vit D supplementation but is at least allowing MVI.  3) History of significant peripheral vascular disease. Status post left CEA. No evidence of TIA, CAD.  4) Mood continues improved compared to several years past, perhaps because less phenobarbital or because of more aggressive psychotropic treatment.     DISCUSSION:  We discussed our experience with COVID in people with epilepsy and suggested that vaccination was the best course for her. She was not moved and stated that she would not get the vaccine.      PLAN:  1) Same phenobarbital, carbamazepine and lacosamide. Rx approved for 6 to 12 months as possible. We had previously suggested tapering of lacosamide; she was having frequent seizures while  taking this medication and it is not clear it has helped her. However I don't think it is appropriate to do this under current circumstances. We have not had opportunity to examine her face to face in over a year and breakthrough requiring admission needs to be avoided in current circumstances.  2) We again suggested Vit D supplementation and patient again aggressively resisted.  3) Maximizing carbamazepine or lacosamide; substituting OXC for carbamazepine or LTG for PB could be considered if clinically indicated. If seizures stay controlled we can cosider tapering either PB or lacosamide in the future provided she agrees.  4) RTC 6 months for in person visit. Call if any seizures. Reviewed symptoms of TIA and asked to present to ED if these occur.  5) If seizures worsen, consider reimaging especially given persistent left temporal slowing and epileptiform activity.     Total time on phone 22 minutes including about 7 minutes obtaining additional history from caregiver.     Karthik Curtis

## 2021-08-26 RX ORDER — PHENOBARBITAL 64.8 MG/1
64.8 TABLET ORAL AT BEDTIME
Qty: 31 TABLET | Refills: 5 | Status: SHIPPED | OUTPATIENT
Start: 2021-08-26 | End: 2022-03-03

## 2021-08-26 RX ORDER — LACOSAMIDE 150 MG/1
TABLET ORAL
Qty: 31 TABLET | Refills: 5 | Status: SHIPPED | OUTPATIENT
Start: 2021-08-26 | End: 2022-03-30

## 2021-08-26 RX ORDER — LACOSAMIDE 100 MG/1
TABLET ORAL
Qty: 31 TABLET | Refills: 5 | Status: SHIPPED | OUTPATIENT
Start: 2021-08-26 | End: 2022-03-30

## 2022-02-03 DIAGNOSIS — G40.209 LOCALIZATION-RELATED PARTIAL EPILEPSY WITH COMPLEX PARTIAL SEIZURES (H): ICD-10-CM

## 2022-02-07 NOTE — TELEPHONE ENCOUNTER
"  carBAMazepine (TEGRETOL XR) 200 MG 12 hr tablet    Last Written Prescription Date: 1/27/21  Last Fill Quantity: 124,   # refills: 11  Last Office Visit : 8/25/21  Future Office visit: none    \"RTC 6 months for in person visit\"    Scheduling has been notified to contact the pt for appointment.    Routed because:    Anti-Seizure Meds Protocol  Failed 02/03/2022 07:43 AM   Protocol Details  Review Authorizing provider's last note.     Normal CBC on file in past 26 months    Normal ALT or AST on file in past 26 months    Normal platelet count on file in past 26 months    Carbamazepine level within therapeutic range in last 26 months                "

## 2022-02-08 RX ORDER — CARBAMAZEPINE 200 MG/1
TABLET, EXTENDED RELEASE ORAL
Qty: 124 TABLET | Refills: 0 | Status: SHIPPED | OUTPATIENT
Start: 2022-02-08 | End: 2022-03-03

## 2022-03-03 DIAGNOSIS — G40.109 SYMPTOMATIC LOCALIZATION-RELATED EPILEPSY (H): ICD-10-CM

## 2022-03-03 DIAGNOSIS — G40.209 LOCALIZATION-RELATED PARTIAL EPILEPSY WITH COMPLEX PARTIAL SEIZURES (H): ICD-10-CM

## 2022-03-03 RX ORDER — PHENOBARBITAL 64.8 MG/1
TABLET ORAL
Qty: 31 TABLET | Refills: 2 | Status: SHIPPED | OUTPATIENT
Start: 2022-03-03 | End: 2022-04-19

## 2022-03-03 RX ORDER — CARBAMAZEPINE 200 MG/1
TABLET, EXTENDED RELEASE ORAL
Qty: 124 TABLET | Refills: 2 | Status: SHIPPED | OUTPATIENT
Start: 2022-03-03 | End: 2022-04-19

## 2022-03-03 NOTE — TELEPHONE ENCOUNTER
CARBAMAZEPIN 200MG ER  Last Written Prescription Date:   2/8/2022  Last Fill Quantity: 124,   # refills: 0  Last Office Visit : 8/25/2021  Future Office visit:   4/19/2022  Routing refill request to provider for review/approval because:  Refer to Provider for review     PHENOBARBITAL 64.8 MG TABLET  Last Written Prescription Date:   8/26/2021  Last Fill Quantity: 31,   # refills: 5  Last Office Visit : 8/25/2021  Future Office visit:   4/19/2022  Routing refill request to provider for review/approval because:  Not on refill protocol.   Refer to Provider for review       Brie Barakat RN  Central Triage Red Flags/Med Refills

## 2022-03-30 DIAGNOSIS — G40.109 SYMPTOMATIC LOCALIZATION-RELATED EPILEPSY (H): ICD-10-CM

## 2022-03-30 DIAGNOSIS — G40.209 LOCALIZATION-RELATED PARTIAL EPILEPSY WITH COMPLEX PARTIAL SEIZURES (H): ICD-10-CM

## 2022-03-30 NOTE — TELEPHONE ENCOUNTER
VIMPAT    100MG  Last Written Prescription Date:   8/26/2021  Last Fill Quantity: 31,   # refills: 5  Last Office Visit :  8/25/2021  Future Office visit:   4/19/2022  Routing refill request to provider for review/approval because:  Drug not on the [facility], [facility] or [facility] refill protocol or controlled substance    VIMPAT    150MG  Last Written Prescription Date:   8/26/2021  Last Fill Quantity: 31,   # refills: 5  Last Office Visit :  8/25/2021  Future Office visit:   4/19/2022  Routing refill request to provider for review/approval because:  Drug not on the [facility], [facility] or [facility] refill protocol or controlled substance    Brie Barakat RN  Central Triage Red Flags/Med Refills

## 2022-04-01 RX ORDER — LACOSAMIDE 150 MG/1
150 TABLET ORAL AT BEDTIME
Qty: 31 TABLET | Refills: 1 | Status: SHIPPED | OUTPATIENT
Start: 2022-04-01 | End: 2022-04-19

## 2022-04-01 RX ORDER — LACOSAMIDE 100 MG/1
100 TABLET ORAL EVERY MORNING
Qty: 31 TABLET | Refills: 1 | Status: SHIPPED | OUTPATIENT
Start: 2022-04-01 | End: 2022-04-19

## 2022-04-19 ENCOUNTER — TELEPHONE (OUTPATIENT)
Dept: NEUROLOGY | Facility: CLINIC | Age: 67
End: 2022-04-19

## 2022-04-19 ENCOUNTER — OFFICE VISIT (OUTPATIENT)
Dept: NEUROLOGY | Facility: CLINIC | Age: 67
End: 2022-04-19
Payer: COMMERCIAL

## 2022-04-19 VITALS — HEART RATE: 88 BPM | SYSTOLIC BLOOD PRESSURE: 121 MMHG | TEMPERATURE: 97 F | DIASTOLIC BLOOD PRESSURE: 71 MMHG

## 2022-04-19 DIAGNOSIS — G40.209 LOCALIZATION-RELATED PARTIAL EPILEPSY WITH COMPLEX PARTIAL SEIZURES (H): ICD-10-CM

## 2022-04-19 DIAGNOSIS — F02.818 DEMENTIA DUE TO MEDICAL CONDITION WITH BEHAVIORAL DISTURBANCE (H): Primary | ICD-10-CM

## 2022-04-19 DIAGNOSIS — G40.109 SYMPTOMATIC LOCALIZATION-RELATED EPILEPSY (H): ICD-10-CM

## 2022-04-19 DIAGNOSIS — I73.9 PERIPHERAL VASCULAR DISEASE (H): ICD-10-CM

## 2022-04-19 LAB
CARBAMAZEPINE SERPL-MCNC: 8.8 MG/L
PHENOBARB SERPL-MCNC: 20 MG/L
SODIUM SERPL-SCNC: 140 MMOL/L (ref 133–144)

## 2022-04-19 PROCEDURE — 80235 DRUG ASSAY LACOSAMIDE: CPT | Performed by: PSYCHIATRY & NEUROLOGY

## 2022-04-19 PROCEDURE — 80156 ASSAY CARBAMAZEPINE TOTAL: CPT | Performed by: PSYCHIATRY & NEUROLOGY

## 2022-04-19 PROCEDURE — 80184 ASSAY OF PHENOBARBITAL: CPT | Performed by: PSYCHIATRY & NEUROLOGY

## 2022-04-19 PROCEDURE — 84295 ASSAY OF SERUM SODIUM: CPT | Performed by: PSYCHIATRY & NEUROLOGY

## 2022-04-19 RX ORDER — LACOSAMIDE 100 MG/1
100 TABLET ORAL EVERY MORNING
Qty: 31 TABLET | Refills: 5 | Status: SHIPPED | OUTPATIENT
Start: 2022-04-19 | End: 2022-10-13

## 2022-04-19 RX ORDER — LACOSAMIDE 150 MG/1
150 TABLET ORAL AT BEDTIME
Qty: 31 TABLET | Refills: 5 | Status: SHIPPED | OUTPATIENT
Start: 2022-04-19 | End: 2022-10-13

## 2022-04-19 RX ORDER — CARBAMAZEPINE 200 MG/1
TABLET, EXTENDED RELEASE ORAL
Qty: 124 TABLET | Refills: 11 | Status: SHIPPED | OUTPATIENT
Start: 2022-04-19 | End: 2023-01-17

## 2022-04-19 RX ORDER — CALCIUM CARBONATE 500 MG/1
750 TABLET, CHEWABLE ORAL 2 TIMES DAILY
COMMUNITY
Start: 2021-12-07

## 2022-04-19 RX ORDER — DULOXETIN HYDROCHLORIDE 20 MG/1
20 CAPSULE, DELAYED RELEASE ORAL DAILY
COMMUNITY

## 2022-04-19 RX ORDER — PHENOBARBITAL 64.8 MG/1
TABLET ORAL
Qty: 31 TABLET | Refills: 5 | Status: SHIPPED | OUTPATIENT
Start: 2022-04-19 | End: 2022-11-10

## 2022-04-19 NOTE — PROGRESS NOTES
[facility] Epilepsy Care Progress Note      Patient:  Zahra Benjamin  :  1955   Age:  66 year old   Today's Office Visit:  2022    Epilepsy Data:    Seizure Record  Current Visit Date: 22  Previous Visit Date: 21  Months since last visit: 7.79    Seizure Type 1: Simple partial onset followed by impairment of consciousness  Description of Sz Type 1: stare with unilat automatims and stiffening  # of Type 1 Seizure since last visit: 0  Freq. Type 1 / Month: 0  Seizure Type 2: Tonic-clonic seizures  Description of Sz Type 2: convulsion  # of Type 2 Seizure since last visit: 0  Freq. Type 2 / Month: 0  Description of Sz Type 3: 0    Background History:  Left temporal CPS. Well controlled w /d, /d, CBZ 1000/d while followed at [facility] between  and . She refused simplification of regimen. 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 AEDs changed to PB 60/d and /d w recurrent sz. Referred here where Vimpat increased to 350/d w persistent seizures. Initially refused another AED but relented after ambulatory EEG found daily seizures. We restarted carbamazepine increasing to 800 mg with improvement. Has refused tapering of PB. Sig vit D deficiency, refused supplementation.    History of Present Illness:     No typical seizures. Will stare off for a few seconds here and there but always responds when staff calls. No automatic activity.  Not clear when last seizure occurred; Synopsis view suggests they occurred in late .    We spent some time trying to persuade her to take Vitamin D supplementation today and she finally consented but did not want to take it any more often than once per week.    She denies focal neurologic symptoms suggesting TIA.    Current Outpatient Medications   Medication Sig Dispense Refill     acetaminophen (TYLENOL) 500 MG tablet Take 2 tablets by mouth every 8 hours. 100 tablet       acetaminophen-codeine (TYLENOL #4) 300-60 MG per tablet Take 1 tablet by mouth       calcium carbonate (TUMS) 500 MG chewable tablet Take 750 mg by mouth 2 times daily       carBAMazepine (TEGRETOL XR) 200 MG 12 hr tablet TAKE 1 TABLET BY MOUTH EVERY DAY IN THE MORNING, 1 TABLET IN THE EVENING & 2 TABLETS AT BEDTIME (TOTAL 800 MG PER DAY) 124 tablet 2     Cyanocobalamin (VITAMIN B-12) 2500 MCG tablet Place 2,500 mcg under the tongue daily.       docusate sodium (COLACE) 100 MG capsule Take 100 mg by mouth 2 times daily.       DULoxetine (CYMBALTA) 20 MG capsule Take 20 mg by mouth daily       Ferrous Sulfate Dried (SLOW IRON PO) Take 50 mg by mouth daily       Lacosamide (VIMPAT) 100 MG TABS tablet Take 1 tablet (100 mg) by mouth every morning 31 tablet 1     lacosamide (VIMPAT) 150 MG TABS tablet Take 1 tablet (150 mg) by mouth At Bedtime TAKE 1 TABLET BY MOUTH EVERY DAY AT BEDTIME 31 tablet 1     magnesium oxide (MAG-OX) 400 MG tablet Take 400 mg by mouth At Bedtime.       multivitamin, therapeutic with minerals (THERA-VIT-M) TABS Take 1 tablet by mouth daily.       olanzapine (ZYPREXA) 2.5 MG tablet Take 0.5 tablets by mouth 2 times daily. 30 tablet 0     PANtoprazole (PROTONIX) 40 MG enteric coated tablet Take 1 tablet by mouth 2 times daily. 60 tablet 0     PHENobarbital (LUMINAL) 64.8 MG tablet TAKE 1 TABLET (64.8 MG) BY MOUTH AT BEDTIME 31 tablet 2     ROSUVASTATIN CALCIUM PO Take 10 mg by mouth daily       CALCIUM 600 + D 600-200 MG-UNIT OR TABS 2 times daily (Patient not taking: No sig reported)  3     calcium carbonate 750 MG CHEW Take 1,500 mg by mouth daily. (Patient not taking: No sig reported)       Ferrous Gluconate-C-Folic Acid (IRON-C PO) Take 50 mg by mouth daily (Patient not taking: Reported on 4/19/2022)       hydrOXYzine (VISTARIL) 25 MG capsule Take 25 mg by mouth 4 times daily as needed  (Patient not taking: Reported on 4/19/2022)       NONFORMULARY CBD Oil (Patient not taking: Reported on  4/19/2022)       ondansetron (ZOFRAN) 4 MG tablet Take 4-8 mg by mouth (Patient not taking: Reported on 4/19/2022)       STATIN NOT PRESCRIBED, INTENTIONAL, by Other route continuous prn. (Patient not taking: No sig reported)  0        Perceived AED Side Effects:  No    Medication Notes:  No side effecs. MAR reviewed and AEDs as above.   AED Medication Compliance:  compliant all of the time  Using a pill box:  Yes    Review of Systems:  No vomiting, diarrhea, fevers, hematuria or kidney stones.  No difficulty swallowing, cough, blood per rectum. Weight fluctuates.  Have you experienced a traumatic fall since your last visit: NO  Are these falls related to your seizures:  Not Applicable      Other Issues:    Mood is doing reasonably well. Staff states she is doing great.  Staff feels that stability of caregivers has helped.  Is patient safe to drive:  No    Woman Care:   Patient is:  Postmenopausal.    Exam:    /71   Pulse 88   Temp 97  F (36.1  C) (Temporal)      Wt Readings from Last 5 Encounters:   12/03/11 147 lb 0.2 oz (66.7 kg)   10/08/09 159 lb (72.1 kg)   10/17/06 135 lb (61.2 kg)   08/29/06 135 lb (61.2 kg)   06/22/06 135 lb (61.2 kg)     Heart exam without murmur. RRR. No carotid bruit. No irritability, she is pleasant and interactive today. Cooperative; much less bitter than several years ago. Language fluent, follows commands, appropriate. In wheel chair with bilateral leg amputations so cannot evaluate gait. Visual fields full to confrontation. Extraocular movements intact without nystagmus. Smile symmetrical. Facial sensation equal. Tongue midline and strong. No drift, pronation, or tremor. Reflexes difficult to obtain in UE, AKA left, BKA right. Finger finger nose is done well. No resting tremor today. Tone is normal in arms and blink rate is normal as well. Voice is strong without tremor.    Assessment and Plan:   1) Focal epilepsy; focal seizures, left temporal onset. Seizures appear to have  improved significantly following addition of carbamazepine and are probably completely controlled. Not clear whether minor episodes reported are seizures or not; immediate responsiveness suggests they are not and patient does not believe they are. Under observation most of the day so probably not having lengthy seizures. Not clear she requires lacosamide.   2) Severe vitamin D deficiency; patient consented to vitamin D supplementation today.  3) History of significant peripheral vascular disease. Status post left CEA. No evidence of TIA, CAD.  4) Mood continues improved compared to several years past, perhaps because less phenobarbital or because of more aggressive psychotropic treatment.     DISCUSSION:  We discussed our experience with COVID in people with epilepsy and suggested that vaccination was the best course for her. She was not moved and stated that she would not get the vaccine.      PLAN:  1) Same phenobarbital, carbamazepine and lacosamide. Rx approved for 6 to 12 months as possible. We had previously suggested tapering of lacosamide; she was having frequent seizures while taking this medication and it is not clear it has helped her. She did not want to make any changes in antiseizure medications today.  2) Vitamin D 4000 international unit(s) once per week.  3) Maximizing carbamazepine or lacosamide; substituting OXC for carbamazepine or LTG for PB could be considered if clinically indicated. If seizures stay controlled we can cosider tapering either PB or lacosamide in the future provided she agrees.  4) RTC 9 months for in person visit. Call if any seizures. Reviewed symptoms of TIA and asked to present to ED if these occur.  5) If seizures worsen, consider reimaging especially given persistent left temporal slowing and epileptiform activity.  6 ) AED levels today to confirm compliance, rule out toxicity. Sodium to exclude side effects.     Total time in person today 24 min. Additional 5 min  reviewing chart prior to visit. Additional 6 min generating note and coordinating care following visit. So total of 35 min, all on day of visit.    MD Karthik Sharma

## 2022-04-19 NOTE — LETTER
2022     RE: Zahra Benjamin  : 1955   MRN: [id number]      Dear Colleague,    Thank you for referring your patient, Zahra Benjamin, to the [facility] EPILEPSY CARE at [facility]. Please see a copy of my visit note below.    [facility] Epilepsy Care Progress Note      Patient:  Zahra Benjamin  :  1955   Age:  66 year old   Today's Office Visit:  2022    Epilepsy Data:    Seizure Record  Current Visit Date: 22  Previous Visit Date: 21  Months since last visit: 7.79    Seizure Type 1: Simple partial onset followed by impairment of consciousness  Description of Sz Type 1: stare with unilat automatims and stiffening  # of Type 1 Seizure since last visit: 0  Freq. Type 1 / Month: 0  Seizure Type 2: Tonic-clonic seizures  Description of Sz Type 2: convulsion  # of Type 2 Seizure since last visit: 0  Freq. Type 2 / Month: 0  Description of Sz Type 3: 0    Background History:  Left temporal CPS. Well controlled w /d, /d, CBZ 1000/d while followed at [facility] between  and . She refused simplification of regimen. 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 AEDs changed to PB 60/d and /d w recurrent sz. Referred here where Vimpat increased to 350/d w persistent seizures. Initially refused another AED but relented after ambulatory EEG found daily seizures. We restarted carbamazepine increasing to 800 mg with improvement. Has refused tapering of PB. Sig vit D deficiency, refused supplementation.    History of Present Illness:     No typical seizures. Will stare off for a few seconds here and there but always responds when staff calls. No automatic activity.  Not clear when last seizure occurred; Synopsis view suggests they occurred in late .    We spent some time trying to persuade her to take Vitamin D supplementation today and she finally consented but did  not want to take it any more often than once per week.    She denies focal neurologic symptoms suggesting TIA.    Current Outpatient Medications   Medication Sig Dispense Refill     acetaminophen (TYLENOL) 500 MG tablet Take 2 tablets by mouth every 8 hours. 100 tablet      acetaminophen-codeine (TYLENOL #4) 300-60 MG per tablet Take 1 tablet by mouth       calcium carbonate (TUMS) 500 MG chewable tablet Take 750 mg by mouth 2 times daily       carBAMazepine (TEGRETOL XR) 200 MG 12 hr tablet TAKE 1 TABLET BY MOUTH EVERY DAY IN THE MORNING, 1 TABLET IN THE EVENING & 2 TABLETS AT BEDTIME (TOTAL 800 MG PER DAY) 124 tablet 2     Cyanocobalamin (VITAMIN B-12) 2500 MCG tablet Place 2,500 mcg under the tongue daily.       docusate sodium (COLACE) 100 MG capsule Take 100 mg by mouth 2 times daily.       DULoxetine (CYMBALTA) 20 MG capsule Take 20 mg by mouth daily       Ferrous Sulfate Dried (SLOW IRON PO) Take 50 mg by mouth daily       Lacosamide (VIMPAT) 100 MG TABS tablet Take 1 tablet (100 mg) by mouth every morning 31 tablet 1     lacosamide (VIMPAT) 150 MG TABS tablet Take 1 tablet (150 mg) by mouth At Bedtime TAKE 1 TABLET BY MOUTH EVERY DAY AT BEDTIME 31 tablet 1     magnesium oxide (MAG-OX) 400 MG tablet Take 400 mg by mouth At Bedtime.       multivitamin, therapeutic with minerals (THERA-VIT-M) TABS Take 1 tablet by mouth daily.       olanzapine (ZYPREXA) 2.5 MG tablet Take 0.5 tablets by mouth 2 times daily. 30 tablet 0     PANtoprazole (PROTONIX) 40 MG enteric coated tablet Take 1 tablet by mouth 2 times daily. 60 tablet 0     PHENobarbital (LUMINAL) 64.8 MG tablet TAKE 1 TABLET (64.8 MG) BY MOUTH AT BEDTIME 31 tablet 2     ROSUVASTATIN CALCIUM PO Take 10 mg by mouth daily       CALCIUM 600 + D 600-200 MG-UNIT OR TABS 2 times daily (Patient not taking: No sig reported)  3     calcium carbonate 750 MG CHEW Take 1,500 mg by mouth daily. (Patient not taking: No sig reported)       Ferrous Gluconate-C-Folic Acid  (IRON-C PO) Take 50 mg by mouth daily (Patient not taking: Reported on 4/19/2022)       hydrOXYzine (VISTARIL) 25 MG capsule Take 25 mg by mouth 4 times daily as needed  (Patient not taking: Reported on 4/19/2022)       NONFORMULARY CBD Oil (Patient not taking: Reported on 4/19/2022)       ondansetron (ZOFRAN) 4 MG tablet Take 4-8 mg by mouth (Patient not taking: Reported on 4/19/2022)       STATIN NOT PRESCRIBED, INTENTIONAL, by Other route continuous prn. (Patient not taking: No sig reported)  0        Perceived AED Side Effects:  No    Medication Notes:  No side effecs. MAR reviewed and AEDs as above.   AED Medication Compliance:  compliant all of the time  Using a pill box:  Yes    Review of Systems:  No vomiting, diarrhea, fevers, hematuria or kidney stones.  No difficulty swallowing, cough, blood per rectum. Weight fluctuates.  Have you experienced a traumatic fall since your last visit: NO  Are these falls related to your seizures:  Not Applicable      Other Issues:    Mood is doing reasonably well. Staff states she is doing great.  Staff feels that stability of caregivers has helped.  Is patient safe to drive:  No    Woman Care:   Patient is:  Postmenopausal.    Exam:    /71   Pulse 88   Temp 97  F (36.1  C) (Temporal)      Wt Readings from Last 5 Encounters:   12/03/11 147 lb 0.2 oz (66.7 kg)   10/08/09 159 lb (72.1 kg)   10/17/06 135 lb (61.2 kg)   08/29/06 135 lb (61.2 kg)   06/22/06 135 lb (61.2 kg)     Heart exam without murmur. RRR. No carotid bruit. No irritability, she is pleasant and interactive today. Cooperative; much less bitter than several years ago. Language fluent, follows commands, appropriate. In wheel chair with bilateral leg amputations so cannot evaluate gait. Visual fields full to confrontation. Extraocular movements intact without nystagmus. Smile symmetrical. Facial sensation equal. Tongue midline and strong. No drift, pronation, or tremor. Reflexes difficult to obtain in UE,  AKA left, BKA right. Finger finger nose is done well. No resting tremor today. Tone is normal in arms and blink rate is normal as well. Voice is strong without tremor.    Assessment and Plan:   1) Focal epilepsy; focal seizures, left temporal onset. Seizures appear to have improved significantly following addition of carbamazepine and are probably completely controlled. Not clear whether minor episodes reported are seizures or not; immediate responsiveness suggests they are not and patient does not believe they are. Under observation most of the day so probably not having lengthy seizures. Not clear she requires lacosamide.   2) Severe vitamin D deficiency; patient consented to vitamin D supplementation today.  3) History of significant peripheral vascular disease. Status post left CEA. No evidence of TIA, CAD.  4) Mood continues improved compared to several years past, perhaps because less phenobarbital or because of more aggressive psychotropic treatment.     DISCUSSION:  We discussed our experience with COVID in people with epilepsy and suggested that vaccination was the best course for her. She was not moved and stated that she would not get the vaccine.      PLAN:  1) Same phenobarbital, carbamazepine and lacosamide. Rx approved for 6 to 12 months as possible. We had previously suggested tapering of lacosamide; she was having frequent seizures while taking this medication and it is not clear it has helped her. She did not want to make any changes in antiseizure medications today.  2) Vitamin D 4000 international unit(s) once per week.  3) Maximizing carbamazepine or lacosamide; substituting OXC for carbamazepine or LTG for PB could be considered if clinically indicated. If seizures stay controlled we can cosider tapering either PB or lacosamide in the future provided she agrees.  4) RTC 9 months for in person visit. Call if any seizures. Reviewed symptoms of TIA and asked to present to ED if these occur.  5)  If seizures worsen, consider reimaging especially given persistent left temporal slowing and epileptiform activity.  6 ) AED levels today to confirm compliance, rule out toxicity. Sodium to exclude side effects.     Total time in person today 24 min. Additional 5 min reviewing chart prior to visit. Additional 6 min generating note and coordinating care following visit. So total of 35 min, all on day of visit.    Karthik Curtis MD

## 2022-04-19 NOTE — TELEPHONE ENCOUNTER
Received Dose Question fax to be completed for clarification. Fax saved to [employer] drive, encounter routed.

## 2022-04-24 LAB — LACOSAMIDE SERPL-MCNC: 7.5 UG/ML

## 2022-04-27 ENCOUNTER — TELEPHONE (OUTPATIENT)
Dept: NEUROLOGY | Facility: CLINIC | Age: 67
End: 2022-04-27
Payer: COMMERCIAL

## 2022-04-27 NOTE — TELEPHONE ENCOUNTER
Pharmacy sent the RX asking if they needed to be Brand or can the patient have Generic.  Form saved to the [employer] [facility].  Encounter routed.  Saida Rhodes, TUSHAR

## 2022-07-20 ENCOUNTER — TELEPHONE (OUTPATIENT)
Dept: NEUROLOGY | Facility: CLINIC | Age: 67
End: 2022-07-20

## 2022-07-20 DIAGNOSIS — Z79.899 ON ANTIEPILEPTIC THERAPY: ICD-10-CM

## 2022-07-20 DIAGNOSIS — G40.209 LOCALIZATION-RELATED PARTIAL EPILEPSY WITH COMPLEX PARTIAL SEIZURES (H): Primary | ICD-10-CM

## 2022-07-20 NOTE — TELEPHONE ENCOUNTER
What is the concern that needs to be addressed by a nurse? Pt had two seizures over the weekend, and clinic is wondering if she needs to be seen soon, please call back.     May a detailed message be left on [employer]? yes    Date of last office visit: 4/9/22    Message routed to: mincep rn pool

## 2022-07-20 NOTE — TELEPHONE ENCOUNTER
Patient called the PCP office reporting seizure like activity over the weekend. Reported 2 episodes over 24 hours where she reported she had a blank stare for about 30 seconds but remained conscious.     Fax lab orders to [phone].

## 2022-10-13 DIAGNOSIS — G40.109 SYMPTOMATIC LOCALIZATION-RELATED EPILEPSY (H): ICD-10-CM

## 2022-10-13 DIAGNOSIS — G40.209 LOCALIZATION-RELATED PARTIAL EPILEPSY WITH COMPLEX PARTIAL SEIZURES (H): ICD-10-CM

## 2022-10-13 NOTE — TELEPHONE ENCOUNTER
LACOSAMIDE 150MG  Last Written Prescription Date:   4/19/2022  Last Fill Quantity: 31,   # refills: 5  Last Office Visit :  4/19/2022  Future Office visit:   1/17/2023  Routing refill request to provider for review/approval because:  Drug not on the [facility], [facility] or [facility] refill protocol or controlled substance    LACOSAMIDE 100MG  Last Written Prescription Date:   4/19/2022  Last Fill Quantity: 31,   # refills: 5  Last Office Visit :  4/19/2022  Future Office visit:   1/17/2023  Routing refill request to provider for review/approval because:  Drug not on the [facility], [facility] or [facility] refill protocol or controlled substance    Brie Barakat RN  Central Triage Red Flags/Med Refills

## 2022-10-14 RX ORDER — LACOSAMIDE 100 MG/1
100 TABLET ORAL EVERY MORNING
Qty: 31 TABLET | Refills: 5 | Status: SHIPPED | OUTPATIENT
Start: 2022-10-14 | End: 2023-01-17

## 2022-10-14 RX ORDER — LACOSAMIDE 150 MG/1
150 TABLET ORAL AT BEDTIME
Qty: 31 TABLET | Refills: 5 | Status: SHIPPED | OUTPATIENT
Start: 2022-10-14 | End: 2023-01-17

## 2022-11-10 DIAGNOSIS — G40.209 LOCALIZATION-RELATED PARTIAL EPILEPSY WITH COMPLEX PARTIAL SEIZURES (H): ICD-10-CM

## 2022-11-10 DIAGNOSIS — G40.109 SYMPTOMATIC LOCALIZATION-RELATED EPILEPSY (H): ICD-10-CM

## 2022-11-10 RX ORDER — PHENOBARBITAL 64.8 MG/1
64.8 TABLET ORAL AT BEDTIME
Qty: 31 TABLET | Refills: 4 | Status: SHIPPED | OUTPATIENT
Start: 2022-11-10 | End: 2023-04-29

## 2022-11-10 NOTE — TELEPHONE ENCOUNTER
PHENOBARB 64.8MG  Last Written Prescription Date:  4/19/2022  Last Fill Quantity: 31,   # refills: 5  Last Office Visit :  4/19/2022  Future Office visit:   1/17/2023    Routing refill request to provider for review/approval because:  Drug not on the [facility], [facility] or [facility] refill protocol or controlled substance      Brie Barakat RN  Central Triage Red Flags/Med Refills

## 2023-01-17 ENCOUNTER — OFFICE VISIT (OUTPATIENT)
Dept: NEUROLOGY | Facility: CLINIC | Age: 68
End: 2023-01-17
Payer: MEDICARE

## 2023-01-17 VITALS — DIASTOLIC BLOOD PRESSURE: 73 MMHG | SYSTOLIC BLOOD PRESSURE: 137 MMHG | HEART RATE: 82 BPM | TEMPERATURE: 98.6 F

## 2023-01-17 DIAGNOSIS — G40.209 LOCALIZATION-RELATED PARTIAL EPILEPSY WITH COMPLEX PARTIAL SEIZURES (H): ICD-10-CM

## 2023-01-17 DIAGNOSIS — G40.109 SYMPTOMATIC LOCALIZATION-RELATED EPILEPSY (H): ICD-10-CM

## 2023-01-17 LAB
CARBAMAZEPINE SERPL-MCNC: 7.7 UG/ML (ref 4–12)
PHENOBARB SERPL-MCNC: 14 UG/ML
SODIUM SERPL-SCNC: 135 MMOL/L (ref 136–145)

## 2023-01-17 PROCEDURE — 80235 DRUG ASSAY LACOSAMIDE: CPT | Mod: ORL | Performed by: PSYCHIATRY & NEUROLOGY

## 2023-01-17 PROCEDURE — 80184 ASSAY OF PHENOBARBITAL: CPT | Mod: ORL | Performed by: PSYCHIATRY & NEUROLOGY

## 2023-01-17 PROCEDURE — 80156 ASSAY CARBAMAZEPINE TOTAL: CPT | Mod: ORL | Performed by: PSYCHIATRY & NEUROLOGY

## 2023-01-17 RX ORDER — CARBAMAZEPINE 200 MG/1
TABLET, EXTENDED RELEASE ORAL
Qty: 124 TABLET | Refills: 11 | Status: SHIPPED | OUTPATIENT
Start: 2023-01-17 | End: 2024-02-04

## 2023-01-17 RX ORDER — LACOSAMIDE 150 MG/1
150 TABLET ORAL AT BEDTIME
Qty: 31 TABLET | Refills: 5 | Status: SHIPPED | OUTPATIENT
Start: 2023-01-17 | End: 2023-08-18

## 2023-01-17 RX ORDER — LACOSAMIDE 100 MG/1
100 TABLET ORAL EVERY MORNING
Qty: 31 TABLET | Refills: 5 | Status: SHIPPED | OUTPATIENT
Start: 2023-01-17 | End: 2023-07-20

## 2023-01-17 ASSESSMENT — PAIN SCALES - GENERAL: PAINLEVEL: NO PAIN (0)

## 2023-01-17 NOTE — PATIENT INSTRUCTIONS
"    During period in which Zahra appears blank please examine as follows:    1) Ask her to follow a simple command such as \"point to the ceiling\" or \"wave your right hand\".  2) Ask her to repeat a simple phrase such as \"today is a good day\"    Write down what has happened.    You can do this about three times. If she responds quickly it is very unlikely that the attacks are seizures.  If she cannot complete these tasks you should give us a call so that we can do further tests.    In the meantime continue taking medications as you are now.  "

## 2023-01-17 NOTE — LETTER
2023     RE: Zahra Benjamin  : 1955   MRN: [id number]      Dear Colleague,    Thank you for referring your patient, Zahra Benjamin, to the [facility] EPILEPSY CARE at [facility]. Please see a copy of my visit note below.    Preventive Care:    Breast Cancer Screening: During our visit today, we discussed that it is recommended you receive breast cancer screening. Please call or make an appointment with your primary care provider to discuss this with them. You may also call the [facility] scheduling line ([phone]) to set up a mammography appointment at the Breast [facility] within the [facility].    [facility] Epilepsy Care Progress Note      Patient:  Zahra Benjamin  :  1955   Age:  67 year old   Today's Office Visit:  2023    Epilepsy Data:    Seizure Record  Current Visit Date: 23  Previous Visit Date: 22  Months since last visit: 8.97  Seizure Type 1: Simple partial onset followed by impairment of consciousness  Description of Sz Type 1: stare with unilat automatims and stiffening  Seizure Type 2: Tonic-clonic seizures  Description of Sz Type 2: convulsion  # of Type 2 Seizure since last visit: 0  Freq. Type 2 / Month: 0  Description of Sz Type 3: 0      History of Present Illness:     No convulsive seizures.    Staff reports weekly spells in which she appears blank briefly. Will respond immediately when called but they do not examine her aggressively.    Current Outpatient Medications   Medication Sig Dispense Refill     acetaminophen (TYLENOL) 500 MG tablet Take 2 tablets by mouth every 8 hours. 100 tablet      acetaminophen-codeine (TYLENOL #4) 300-60 MG per tablet Take 1 tablet by mouth       CALCIUM 600 + D 600-200 MG-UNIT OR TABS 2 times daily (Patient not taking: No sig reported)  3     calcium carbonate (TUMS) 500 MG chewable tablet Take 750 mg by mouth 2 times daily       calcium carbonate 750  MG CHEW Take 1,500 mg by mouth daily. (Patient not taking: No sig reported)       carBAMazepine (TEGRETOL XR) 200 MG 12 hr tablet TAKE 1 TABLET BY MOUTH EVERY DAY IN THE MORNING, 1 TABLET IN THE EVENING & 2 TABLETS AT BEDTIME (TOTAL 800 MG PER DAY) 124 tablet 11     cholecalciferol (VITAMIN D3) 25 mcg (1000 units) capsule Take four capsules once per week. 18 capsule 11     Cyanocobalamin (VITAMIN B-12) 2500 MCG tablet Place 2,500 mcg under the tongue daily.       docusate sodium (COLACE) 100 MG capsule Take 100 mg by mouth 2 times daily.       DULoxetine (CYMBALTA) 20 MG capsule Take 20 mg by mouth daily       Ferrous Gluconate-C-Folic Acid (IRON-C PO) Take 50 mg by mouth daily (Patient not taking: Reported on 4/19/2022)       Ferrous Sulfate Dried (SLOW IRON PO) Take 50 mg by mouth daily       hydrOXYzine (VISTARIL) 25 MG capsule Take 25 mg by mouth 4 times daily as needed  (Patient not taking: Reported on 4/19/2022)       Lacosamide (VIMPAT) 100 MG TABS tablet Take 1 tablet (100 mg) by mouth every morning 31 tablet 5     lacosamide (VIMPAT) 150 MG TABS tablet Take 1 tablet (150 mg) by mouth At Bedtime 31 tablet 5     magnesium oxide (MAG-OX) 400 MG tablet Take 400 mg by mouth At Bedtime.       multivitamin, therapeutic with minerals (THERA-VIT-M) TABS Take 1 tablet by mouth daily.       NONFORMULARY CBD Oil (Patient not taking: Reported on 4/19/2022)       olanzapine (ZYPREXA) 2.5 MG tablet Take 0.5 tablets by mouth 2 times daily. 30 tablet 0     ondansetron (ZOFRAN) 4 MG tablet Take 4-8 mg by mouth (Patient not taking: Reported on 4/19/2022)       PANtoprazole (PROTONIX) 40 MG enteric coated tablet Take 1 tablet by mouth 2 times daily. 60 tablet 0     PHENobarbital (LUMINAL) 64.8 MG tablet Take 1 tablet (64.8 mg) by mouth At Bedtime 31 tablet 4     ROSUVASTATIN CALCIUM PO Take 10 mg by mouth daily       STATIN NOT PRESCRIBED, INTENTIONAL, by Other route continuous prn. (Patient not taking: No sig reported)  0         Perceived AED Side Effects:  No    Medication Notes:  AEDs checked against blister packs staff brought with them.      AED Medication Compliance:  compliant all of the time  Using a pill box:  No    Review of Systems:  No vomiting, diarrhea, fevers, hematuria or kidney stones.  Have you experienced a traumatic fall since your last visit: NO  Are these falls related to your seizures:  Not Applicable      Other Issues:  No other health troubles.  Memory has been doing well.  No TIA type symptoms. No amaurosis.  Is patient safe to drive:  No    Woman Care:   Patient is:  Postmenopausal.    Exam:    /73 (BP Location: Left arm, Patient Position: Sitting, Cuff Size: Adult Regular)   Pulse 82   Temp 98.6  F (37  C) (Temporal)      Wt Readings from Last 5 Encounters:   12/03/11 147 lb 0.2 oz (66.7 kg)   10/08/09 159 lb (72.1 kg)   10/17/06 135 lb (61.2 kg)   08/29/06 135 lb (61.2 kg)   06/22/06 135 lb (61.2 kg)     Heart exam without murmur. RRR. No carotid bruit. No irritability, she continues pleasant, interactive, cooperative; much less bitter than several years ago. Language fluent, follows commands, appropriate. In wheel chair with bilateral leg amputations so cannot evaluate gait. Visual fields full to confrontation. Extraocular movements intact without nystagmus. Smile symmetrical. Facial sensation equal. Tongue midline and strong. No drift, pronation, or tremor. Reflexes difficult to obtain in UE, AKA left, BKA right. Finger finger nose is done well. No resting.      Latest Reference Range & Units 04/19/22 12:09 07/21/22 16:09   Carbamazepine Level mg/L 8.8    Lacosamide 1.0 - 10.0 ug/mL 7.5    Phenobarbital Level mg/L 20    Phenobarbital Level (External) 15.0 - 40.0 mg/L  11.6 (L) (E)   (E): External lab result     Latest Reference Range & Units 04/19/22 12:09 07/21/22 16:09   Sodium 133 - 144 mmol/L 140    Sodium (External) 135 - 146 mmol/L  137 (E)   (E): External lab result    Assessment and Plan:    1) Focal epilepsy; focal seizures, left temporal onset. Seizures appear to have improved significantly following addition of carbamazepine and are probably completely controlled. Not clear whether minor episodes reported are seizures or not; immediate responsiveness suggests they are not and patient does not believe they are. However could be better examined to firm up this conclusion. Under observation most of the day so probably not having lengthy seizures. Not clear she requires lacosamide.   2) Severe vitamin D deficiency; she has consented to vitamin D supplementation.  3) History of significant peripheral vascular disease. Status post left CEA. No evidence of TIA, CAD.  4) Mood continues improved compared to several years past, perhaps because less phenobarbital or because of more aggressive psychotropic treatment.      PLAN:  1) Same phenobarbital, carbamazepine and lacosamide. Rx approved for 6 to 12 months as possible. We had previously suggested tapering of lacosamide; she was having frequent seizures while taking this medication and it is not clear it has helped her. She did not want to make any changes in antiseizure medications today.  2) Maximizing carbamazepine or lacosamide; substituting OXC for carbamazepine or LTG for PB could be considered if clinically indicated. If seizures stay controlled we can cosider tapering either PB or lacosamide in the future provided she agrees.  3) RTC 9 months for in person visit. Call if any seizures. Reviewed symptoms of TIA and asked to present to ED if these occur.  4) If seizures worsen, consider reimaging especially given persistent left temporal slowing and epileptiform activity.  5) AED levels today to confirm compliance, rule out toxicity. Sodium to exclude side effects.  6) Discussed specific testing regimen to further examine patient during minor spells. Wrote this out in AVS.      Total time in person today 25 min. Additional 5 min reviewing chart prior  to visit. Additional 6 min generating note and coordinating care following visit. So total of 36 min, all on day of visit. Reviewed six individual labs as above. Discussed with caregivers who provided additional unique information.     Karthik Curtis MD

## 2023-01-17 NOTE — PROGRESS NOTES
Preventive Care:    Breast Cancer Screening: During our visit today, we discussed that it is recommended you receive breast cancer screening. Please call or make an appointment with your primary care provider to discuss this with them. You may also call the [facility] scheduling line ([phone]) to set up a mammography appointment at the Breast Center within the [facility].    [facility]      Patient:  Zahra Benjamin  :  1955   Age:  67 year old   Today's Office Visit:  2023    Epilepsy Data:    Seizure Record  Current Visit Date: 23  Previous Visit Date: 22  Months since last visit: 8.97  Seizure Type 1: Simple partial onset followed by impairment of consciousness  Description of Sz Type 1: stare with unilat automatims and stiffening  Seizure Type 2: Tonic-clonic seizures  Description of Sz Type 2: convulsion  # of Type 2 Seizure since last visit: 0  Freq. Type 2 / Month: 0  Description of Sz Type 3: 0      History of Present Illness:     No convulsive seizures.    Staff reports weekly spells in which she appears blank briefly. Will respond immediately when called but they do not examine her aggressively.    Current Outpatient Medications   Medication Sig Dispense Refill     acetaminophen (TYLENOL) 500 MG tablet Take 2 tablets by mouth every 8 hours. 100 tablet      acetaminophen-codeine (TYLENOL #4) 300-60 MG per tablet Take 1 tablet by mouth       CALCIUM 600 + D 600-200 MG-UNIT OR TABS 2 times daily (Patient not taking: No sig reported)  3     calcium carbonate (TUMS) 500 MG chewable tablet Take 750 mg by mouth 2 times daily       calcium carbonate 750 MG CHEW Take 1,500 mg by mouth daily. (Patient not taking: No sig reported)       carBAMazepine (TEGRETOL XR) 200 MG 12 hr tablet TAKE 1 TABLET BY MOUTH EVERY DAY IN THE MORNING, 1 TABLET IN THE EVENING & 2 TABLETS AT BEDTIME (TOTAL 800 MG PER DAY) 124 tablet 11      cholecalciferol (VITAMIN D3) 25 mcg (1000 units) capsule Take four capsules once per week. 18 capsule 11     Cyanocobalamin (VITAMIN B-12) 2500 MCG tablet Place 2,500 mcg under the tongue daily.       docusate sodium (COLACE) 100 MG capsule Take 100 mg by mouth 2 times daily.       DULoxetine (CYMBALTA) 20 MG capsule Take 20 mg by mouth daily       Ferrous Gluconate-C-Folic Acid (IRON-C PO) Take 50 mg by mouth daily (Patient not taking: Reported on 4/19/2022)       Ferrous Sulfate Dried (SLOW IRON PO) Take 50 mg by mouth daily       hydrOXYzine (VISTARIL) 25 MG capsule Take 25 mg by mouth 4 times daily as needed  (Patient not taking: Reported on 4/19/2022)       Lacosamide (VIMPAT) 100 MG TABS tablet Take 1 tablet (100 mg) by mouth every morning 31 tablet 5     lacosamide (VIMPAT) 150 MG TABS tablet Take 1 tablet (150 mg) by mouth At Bedtime 31 tablet 5     magnesium oxide (MAG-OX) 400 MG tablet Take 400 mg by mouth At Bedtime.       multivitamin, therapeutic with minerals (THERA-VIT-M) TABS Take 1 tablet by mouth daily.       NONFORMULARY CBD Oil (Patient not taking: Reported on 4/19/2022)       olanzapine (ZYPREXA) 2.5 MG tablet Take 0.5 tablets by mouth 2 times daily. 30 tablet 0     ondansetron (ZOFRAN) 4 MG tablet Take 4-8 mg by mouth (Patient not taking: Reported on 4/19/2022)       PANtoprazole (PROTONIX) 40 MG enteric coated tablet Take 1 tablet by mouth 2 times daily. 60 tablet 0     PHENobarbital (LUMINAL) 64.8 MG tablet Take 1 tablet (64.8 mg) by mouth At Bedtime 31 tablet 4     ROSUVASTATIN CALCIUM PO Take 10 mg by mouth daily       STATIN NOT PRESCRIBED, INTENTIONAL, by Other route continuous prn. (Patient not taking: No sig reported)  0        Perceived AED Side Effects:  No    Medication Notes:  AEDs checked against blister packs staff brought with them.      AED Medication Compliance:  compliant all of the time  Using a pill box:  No    Review of Systems:  No vomiting, diarrhea, fevers, hematuria or  kidney stones.  Have you experienced a traumatic fall since your last visit: NO  Are these falls related to your seizures:  Not Applicable      Other Issues:  No other health troubles.  Memory has been doing well.  No TIA type symptoms. No amaurosis.  Is patient safe to drive:  No    Woman Care:   Patient is:  Postmenopausal.    Exam:    /73 (BP Location: Left arm, Patient Position: Sitting, Cuff Size: Adult Regular)   Pulse 82   Temp 98.6  F (37  C) (Temporal)      Wt Readings from Last 5 Encounters:   12/03/11 147 lb 0.2 oz (66.7 kg)   10/08/09 159 lb (72.1 kg)   10/17/06 135 lb (61.2 kg)   08/29/06 135 lb (61.2 kg)   06/22/06 135 lb (61.2 kg)     Heart exam without murmur. RRR. No carotid bruit. No irritability, she continues pleasant, interactive, cooperative; much less bitter than several years ago. Language fluent, follows commands, appropriate. In wheel chair with bilateral leg amputations so cannot evaluate gait. Visual fields full to confrontation. Extraocular movements intact without nystagmus. Smile symmetrical. Facial sensation equal. Tongue midline and strong. No drift, pronation, or tremor. Reflexes difficult to obtain in UE, AKA left, BKA right. Finger finger nose is done well. No resting.      Latest Reference Range & Units 04/19/22 12:09 07/21/22 16:09   Carbamazepine Level mg/L 8.8    Lacosamide 1.0 - 10.0 ug/mL 7.5    Phenobarbital Level mg/L 20    Phenobarbital Level (External) 15.0 - 40.0 mg/L  11.6 (L) (E)   (E): External lab result     Latest Reference Range & Units 04/19/22 12:09 07/21/22 16:09   Sodium 133 - 144 mmol/L 140    Sodium (External) 135 - 146 mmol/L  137 (E)   (E): External lab result    Assessment and Plan:   1) Focal epilepsy; focal seizures, left temporal onset. Seizures appear to have improved significantly following addition of carbamazepine and are probably completely controlled. Not clear whether minor episodes reported are seizures or not; immediate responsiveness  suggests they are not and patient does not believe they are. However could be better examined to firm up this conclusion. Under observation most of the day so probably not having lengthy seizures. Not clear she requires lacosamide.   2) Severe vitamin D deficiency; she has consented to vitamin D supplementation.  3) History of significant peripheral vascular disease. Status post left CEA. No evidence of TIA, CAD.  4) Mood continues improved compared to several years past, perhaps because less phenobarbital or because of more aggressive psychotropic treatment.      PLAN:  1) Same phenobarbital, carbamazepine and lacosamide. Rx approved for 6 to 12 months as possible. We had previously suggested tapering of lacosamide; she was having frequent seizures while taking this medication and it is not clear it has helped her. She did not want to make any changes in antiseizure medications today.  2) Maximizing carbamazepine or lacosamide; substituting OXC for carbamazepine or LTG for PB could be considered if clinically indicated. If seizures stay controlled we can cosider tapering either PB or lacosamide in the future provided she agrees.  3) RTC 9 months for in person visit. Call if any seizures. Reviewed symptoms of TIA and asked to present to ED if these occur.  4) If seizures worsen, consider reimaging especially given persistent left temporal slowing and epileptiform activity.  5) AED levels today to confirm compliance, rule out toxicity. Sodium to exclude side effects.  6) Discussed specific testing regimen to further examine patient during minor spells. Wrote this out in AVS.      Total time in person today 25 min. Additional 5 min reviewing chart prior to visit. Additional 6 min generating note and coordinating care following visit. So total of 36 min, all on day of visit. Reviewed six individual labs as above. Discussed with caregivers who provided additional unique information.     Karthik Curtis MD
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
## 2023-01-26 LAB — LACOSAMIDE SERPL-MCNC: 7.8 UG/ML

## 2023-04-27 DIAGNOSIS — G40.109 SYMPTOMATIC LOCALIZATION-RELATED EPILEPSY (H): ICD-10-CM

## 2023-04-27 DIAGNOSIS — G40.209 LOCALIZATION-RELATED PARTIAL EPILEPSY WITH COMPLEX PARTIAL SEIZURES (H): ICD-10-CM

## 2023-04-29 NOTE — TELEPHONE ENCOUNTER
PHENOBARB 64.8MG  Last Written Prescription Date:   11/10/2022  Last Fill Quantity: 31,   # refills: 4  Last Office Visit :  1/17/2023  Future Office visit:   10/17/2023    Routing refill request to provider for review/approval because:  Drug not on the [facility], [facility] or [facility] refill protocol or controlled substance      Brie Barakat RN  Central Triage Red Flags/Med Refills

## 2023-04-30 RX ORDER — PHENOBARBITAL 64.8 MG/1
64.8 TABLET ORAL AT BEDTIME
Qty: 31 TABLET | Refills: 5 | Status: SHIPPED | OUTPATIENT
Start: 2023-04-30 | End: 2023-10-12

## 2023-07-19 DIAGNOSIS — G40.209 LOCALIZATION-RELATED PARTIAL EPILEPSY WITH COMPLEX PARTIAL SEIZURES (H): ICD-10-CM

## 2023-07-19 DIAGNOSIS — G40.109 SYMPTOMATIC LOCALIZATION-RELATED EPILEPSY (H): ICD-10-CM

## 2023-07-19 NOTE — TELEPHONE ENCOUNTER
Lacosamide (VIMPAT) 100 MG TABS tablet 31 tablet 5 1/17/2023         Last Written Prescription Date:  1-  Last Fill Quantity: 31,   # refills: 5  Last Office Visit : 1-  Future Office visit:  10-    Routing refill request to provider for review/approval because:  CONTROLLED MEDICATION      Kathleen M Doege RN

## 2023-07-20 RX ORDER — LACOSAMIDE 100 MG/1
100 TABLET ORAL EVERY MORNING
Qty: 31 TABLET | Refills: 4 | Status: SHIPPED | OUTPATIENT
Start: 2023-07-20 | End: 2023-10-17

## 2023-08-16 DIAGNOSIS — G40.109 SYMPTOMATIC LOCALIZATION-RELATED EPILEPSY (H): ICD-10-CM

## 2023-08-16 DIAGNOSIS — G40.209 LOCALIZATION-RELATED PARTIAL EPILEPSY WITH COMPLEX PARTIAL SEIZURES (H): ICD-10-CM

## 2023-08-17 NOTE — TELEPHONE ENCOUNTER
lacosamide (VIMPAT) 150 MG TABS tablet   31 tablet 5 1/17/2023 1/17/2023  [facility]      Karthik Curtis MD  Neurology     Routed because: controlled.

## 2023-08-18 RX ORDER — LACOSAMIDE 150 MG/1
150 TABLET ORAL AT BEDTIME
Qty: 31 TABLET | Refills: 4 | Status: SHIPPED | OUTPATIENT
Start: 2023-08-18 | End: 2023-10-17

## 2023-10-12 DIAGNOSIS — G40.209 LOCALIZATION-RELATED PARTIAL EPILEPSY WITH COMPLEX PARTIAL SEIZURES (H): ICD-10-CM

## 2023-10-12 DIAGNOSIS — G40.109 SYMPTOMATIC LOCALIZATION-RELATED EPILEPSY (H): ICD-10-CM

## 2023-10-12 RX ORDER — PHENOBARBITAL 64.8 MG/1
64.8 TABLET ORAL AT BEDTIME
Qty: 31 TABLET | Refills: 1 | Status: SHIPPED | OUTPATIENT
Start: 2023-10-12 | End: 2023-10-17

## 2023-10-12 NOTE — TELEPHONE ENCOUNTER
PHENobarbital (LUMINAL) 64.8 MG tablet     31 tablet 5 4/30/2023 1/17/2023  [facility]ilepsy [facility]    Karthik Curtis MD  Neurology      Routed because: controlled

## 2023-10-17 ENCOUNTER — OFFICE VISIT (OUTPATIENT)
Dept: NEUROLOGY | Facility: CLINIC | Age: 68
End: 2023-10-17
Payer: MEDICARE

## 2023-10-17 VITALS — TEMPERATURE: 96.8 F | SYSTOLIC BLOOD PRESSURE: 115 MMHG | HEART RATE: 67 BPM | DIASTOLIC BLOOD PRESSURE: 61 MMHG

## 2023-10-17 DIAGNOSIS — G40.109 SYMPTOMATIC LOCALIZATION-RELATED EPILEPSY (H): ICD-10-CM

## 2023-10-17 DIAGNOSIS — I73.9 PERIPHERAL VASCULAR DISEASE (H): ICD-10-CM

## 2023-10-17 DIAGNOSIS — G40.209 LOCALIZATION-RELATED PARTIAL EPILEPSY WITH COMPLEX PARTIAL SEIZURES (H): ICD-10-CM

## 2023-10-17 RX ORDER — PHENOBARBITAL 64.8 MG/1
64.8 TABLET ORAL AT BEDTIME
Qty: 31 TABLET | Refills: 5 | Status: SHIPPED | OUTPATIENT
Start: 2023-10-17 | End: 2024-04-25

## 2023-10-17 RX ORDER — LACOSAMIDE 150 MG/1
150 TABLET ORAL AT BEDTIME
Qty: 31 TABLET | Refills: 5 | Status: SHIPPED | OUTPATIENT
Start: 2023-10-17 | End: 2024-05-22

## 2023-10-17 RX ORDER — LACOSAMIDE 100 MG/1
100 TABLET ORAL EVERY MORNING
Qty: 31 TABLET | Refills: 5 | Status: SHIPPED | OUTPATIENT
Start: 2023-10-17 | End: 2024-05-22

## 2023-10-17 NOTE — PROGRESS NOTES
[facility] Epilepsy Care Progress Note      Patient:  Zahra Benjamin  :  1955   Age:  68 year old   Today's Office Visit:  10/17/2023    Epilepsy Data:    Seizure Record  Current Visit Date: 10/17/23  Previous Visit Date: 23  Months since last visit: 8.97  Seizure Type 1: Simple partial onset followed by impairment of consciousness  Description of Sz Type 1: stare with unilat automatims and stiffening  Seizure Type 2: Tonic-clonic seizures  Description of Sz Type 2: convulsion  # of Type 2 Seizure since last visit: 0  Freq. Type 2 / Month: 0    Background History:  Left temporal CPS. Well controlled w /d, /d, CBZ 1000/d while followed at [facility] between  and . She refused simplification of regimen. 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 AEDs changed to PB 60/d and /d w recurrent sz. Referred here where Vimpat increased to 350/d w persistent seizures. Initially refused another AED but relented after ambulatory EEG found daily seizures. We restarted carbamazepine increasing to 800 mg with improvement. Has refused tapering of PB. Sig vit D deficiency, refused supplementation.    History of Present Illness:     She presents with her PCA who has known her for about 4 months. No convulsions. PCA denies witnessing periods of unresponsiveness and automatic activity.  PCA continues reporting staring spells. Patient not aware when these occur. Patient stares ahead or looks from side to side. PCA has not attempted to interact or determine responsiveness.  Frequency varies from several times per day to twice per week, some weeks without any.  We had provided instructions on how to evaluate but this did not appear to happen and PCA not aware of these.    Current Outpatient Medications   Medication Sig Dispense Refill     acetaminophen (TYLENOL) 500 MG tablet Take 2 tablets by mouth every 8 hours. 100 tablet       acetaminophen-codeine (TYLENOL #4) 300-60 MG per tablet Take 1 tablet by mouth       calcium carbonate (TUMS) 500 MG chewable tablet Take 750 mg by mouth 2 times daily       carBAMazepine (TEGRETOL XR) 200 MG 12 hr tablet TAKE 1 TABLET BY MOUTH EVERY DAY IN THE MORNING, 1 TABLET IN THE EVENING & 2 TABLETS AT BEDTIME (TOTAL 800 MG PER DAY) 124 tablet 11     cholecalciferol (VITAMIN D3) 25 mcg (1000 units) capsule Take four capsules once per week. 18 capsule 11     Cyanocobalamin (VITAMIN B-12) 2500 MCG tablet Place 2,500 mcg under the tongue daily.       docusate sodium (COLACE) 100 MG capsule Take 100 mg by mouth 2 times daily.       DULoxetine (CYMBALTA) 20 MG capsule Take 20 mg by mouth daily       Ferrous Sulfate Dried (SLOW IRON PO) Take 50 mg by mouth daily       Lacosamide (VIMPAT) 100 MG TABS tablet Take 1 tablet (100 mg) by mouth every morning 31 tablet 5     lacosamide (VIMPAT) 150 MG TABS tablet Take 1 tablet (150 mg) by mouth at bedtime 31 tablet 5     magnesium oxide (MAG-OX) 400 MG tablet Take 400 mg by mouth At Bedtime.       multivitamin, therapeutic with minerals (THERA-VIT-M) TABS Take 1 tablet by mouth daily.       olanzapine (ZYPREXA) 2.5 MG tablet Take 0.5 tablets by mouth 2 times daily. 30 tablet 0     PANtoprazole (PROTONIX) 40 MG enteric coated tablet Take 1 tablet by mouth 2 times daily. 60 tablet 0     PHENobarbital (LUMINAL) 64.8 MG tablet Take 1 tablet (64.8 mg) by mouth at bedtime 31 tablet 5     ROSUVASTATIN CALCIUM PO Take 10 mg by mouth daily       CALCIUM 600 + D 600-200 MG-UNIT OR TABS 2 times daily (Patient not taking: No sig reported)  3     calcium carbonate 750 MG CHEW Take 1,500 mg by mouth daily. (Patient not taking: Reported on 4/19/2022)       Ferrous Gluconate-C-Folic Acid (IRON-C PO) Take 50 mg by mouth daily (Patient not taking: Reported on 4/19/2022)       hydrOXYzine (VISTARIL) 25 MG capsule Take 25 mg by mouth 4 times daily as needed  (Patient not taking: Reported on  "4/19/2022)       NONFORMULARY CBD Oil (Patient not taking: Reported on 4/19/2022)          Perceived AED Side Effects:  No    Medication Notes:    Additionally takes tylenol #4, up to three per day.     AED Medication Compliance:  compliant all of the time  Using a pill box:  meds delivered by [employer]    Review of Systems:  No vomiting, diarrhea, fevers, hematuria or kidney stones.  Have you experienced a traumatic fall since your last visit: NO  Are these falls related to your seizures:  Not Applicable    Other Issues:  Reports mild sedation.  Denies TIA type symptoms including amaurosis, focal weakness or numbness, speech deficit or diplopia.  Reports chronic stump pain right greater than left. Denies phantom limb pain.  Is patient safe to drive:  No    Woman Care:   Patient is:  Postmenopausal.    Exam:    /61   Pulse 67   Temp 96.8  F (36  C) (Temporal)      Wt Readings from Last 5 Encounters:   12/03/11 147 lb 0.2 oz (66.7 kg)   10/08/09 159 lb (72.1 kg)   10/17/06 135 lb (61.2 kg)   08/29/06 135 lb (61.2 kg)   06/22/06 135 lb (61.2 kg)     Heart exam without murmur. RRR. No carotid bruit.   No irritability, she continues pleasant, interactive, cooperative; much less bitter than several years ago. Sedated appearing at times, brightens up at others. Language fluent, follows commands, appropriate. States date correctly. Oriented to day, month, year. Short term memory 3/3 verbal objects after distractor. \"World\" spelled backward: radha. Able to state 9 animals in 30 seconds. She has difficulty drawing cube and gives up after a brief try; will not try drawing intersecting pentagons.  In wheel chair with bilateral leg amputations so cannot evaluate gait. Visual fields full to confrontation. Extraocular movements intact without nystagmus. Smile symmetrical. Facial sensation equal. Tongue midline and strong. No drift, pronation, or tremor. Reflexes difficult to obtain in UE, AKA left, BKA right. Finger " finger nose is done well. Minimal resting tremor does not appear parkinsonian.    1/7/23 lacosamide = 7.8, carbamazepine = 7.7, PB = 14, Na = 136.     Assessment and Plan:   1) Focal epilepsy; focal seizures, left temporal onset. Seizures appear to have improved significantly following addition of carbamazepine and are probably completely controlled. Not clear whether minor episodes reported are seizures or not; previously consistently responded to stimulation so we were reassured, now more frequent and not examined so situation is less clear. Could benefit from better evaluation. Not clear she requires lacosamide. Using a fair amount of codeine, sedation could be contributing.  2) Severe vitamin D deficiency; she has consented to vitamin D supplementation.  3) History of significant peripheral vascular disease. Status post left CEA. No evidence of TIA, CAD.  4) Mood continues improved compared to several years past, perhaps because less phenobarbital or because of more aggressive psychotropic treatment.      PLAN:  1) Again encouraged examination; provided specific testing plan in AVS, urged staff to write down results of evaluation; urged patient to comply with evaluation.  2) 24 hour ambulatory EEG.  3) Same phenobarbital, carbamazepine and lacosamide for now. Rx approved for 6 to 12 months as possible. We had previously suggested tapering of lacosamide; she was having frequent seizures while taking this medication and it is not clear it has helped her. She did not want to make any changes in antiseizure medications today.  4) Maximizing carbamazepine or lacosamide; substituting OXC for carbamazepine or LTG for PB could be considered if clinically indicated. If seizures stay controlled we can cosider tapering either PB or lacosamide in the future provided she agrees.  5) RTC 9 months for in person visit. Call if any seizures. Reviewed symptoms of TIA and asked to present to ED if these occur.  6) If seizures  worsen, consider reimaging especially given persistent left temporal slowing and epileptiform activity.  7) AED levels to confirm compliance, rule out toxicity. Sodium to exclude side effects. Patient wanted done at local clinic; printed out lab slips and signed; provided instructions on how to communicate results to us in AVS.        Total time in person today 25 min. Additional 6 min reviewing chart prior to visit. Additional 9 min generating note and coordinating care following visit. So total of 40 min, all on day of visit. Reviewed four individual labs as above. Discussed with caregivers who provided additional unique information.     Karthik Curtis MD

## 2023-10-17 NOTE — LETTER
10/17/2023       RE: Zahra Benjamin  : 1955   MRN: [id number]      Dear Colleague,    Thank you for referring your patient, Zahra Benjamin, to the [facility], [place], operated by [facility] EPILEPSY [facility] at [facility]. Please see a copy of my visit note below.    [facility] Epilepsy Care Progress Note      Patient:  Zahra Benjamin  :  1955   Age:  68 year old   Today's Office Visit:  10/17/2023    Epilepsy Data:    Seizure Record  Current Visit Date: 10/17/23  Previous Visit Date: 23  Months since last visit: 8.97  Seizure Type 1: Simple partial onset followed by impairment of consciousness  Description of Sz Type 1: stare with unilat automatims and stiffening  Seizure Type 2: Tonic-clonic seizures  Description of Sz Type 2: convulsion  # of Type 2 Seizure since last visit: 0  Freq. Type 2 / Month: 0    Background History:  Left temporal CPS. Well controlled w /d, /d, CBZ 1000/d while followed at [facility] between  and . She refused simplification of regimen. 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 AEDs changed to PB 60/d and /d w recurrent sz. Referred here where Vimpat increased to 350/d w persistent seizures. Initially refused another AED but relented after ambulatory EEG found daily seizures. We restarted carbamazepine increasing to 800 mg with improvement. Has refused tapering of PB. Sig vit D deficiency, refused supplementation.    History of Present Illness:     She presents with her PCA who has known her for about 4 months. No convulsions. PCA denies witnessing periods of unresponsiveness and automatic activity.  PCA continues reporting staring spells. Patient not aware when these occur. Patient stares ahead or looks from side to side. PCA has not attempted to interact or determine responsiveness.  Frequency varies from several times per day to twice per week, some weeks  without any.  We had provided instructions on how to evaluate but this did not appear to happen and PCA not aware of these.    Current Outpatient Medications   Medication Sig Dispense Refill    acetaminophen (TYLENOL) 500 MG tablet Take 2 tablets by mouth every 8 hours. 100 tablet     acetaminophen-codeine (TYLENOL #4) 300-60 MG per tablet Take 1 tablet by mouth      calcium carbonate (TUMS) 500 MG chewable tablet Take 750 mg by mouth 2 times daily      carBAMazepine (TEGRETOL XR) 200 MG 12 hr tablet TAKE 1 TABLET BY MOUTH EVERY DAY IN THE MORNING, 1 TABLET IN THE EVENING & 2 TABLETS AT BEDTIME (TOTAL 800 MG PER DAY) 124 tablet 11    cholecalciferol (VITAMIN D3) 25 mcg (1000 units) capsule Take four capsules once per week. 18 capsule 11    Cyanocobalamin (VITAMIN B-12) 2500 MCG tablet Place 2,500 mcg under the tongue daily.      docusate sodium (COLACE) 100 MG capsule Take 100 mg by mouth 2 times daily.      DULoxetine (CYMBALTA) 20 MG capsule Take 20 mg by mouth daily      Ferrous Sulfate Dried (SLOW IRON PO) Take 50 mg by mouth daily      Lacosamide (VIMPAT) 100 MG TABS tablet Take 1 tablet (100 mg) by mouth every morning 31 tablet 5    lacosamide (VIMPAT) 150 MG TABS tablet Take 1 tablet (150 mg) by mouth at bedtime 31 tablet 5    magnesium oxide (MAG-OX) 400 MG tablet Take 400 mg by mouth At Bedtime.      multivitamin, therapeutic with minerals (THERA-VIT-M) TABS Take 1 tablet by mouth daily.      olanzapine (ZYPREXA) 2.5 MG tablet Take 0.5 tablets by mouth 2 times daily. 30 tablet 0    PANtoprazole (PROTONIX) 40 MG enteric coated tablet Take 1 tablet by mouth 2 times daily. 60 tablet 0    PHENobarbital (LUMINAL) 64.8 MG tablet Take 1 tablet (64.8 mg) by mouth at bedtime 31 tablet 5    ROSUVASTATIN CALCIUM PO Take 10 mg by mouth daily      CALCIUM 600 + D 600-200 MG-UNIT OR TABS 2 times daily (Patient not taking: No sig reported)  3    calcium carbonate 750 MG CHEW Take 1,500 mg by mouth daily. (Patient not  "taking: Reported on 4/19/2022)      Ferrous Gluconate-C-Folic Acid (IRON-C PO) Take 50 mg by mouth daily (Patient not taking: Reported on 4/19/2022)      hydrOXYzine (VISTARIL) 25 MG capsule Take 25 mg by mouth 4 times daily as needed  (Patient not taking: Reported on 4/19/2022)      NONFORMULARY CBD Oil (Patient not taking: Reported on 4/19/2022)          Perceived AED Side Effects:  No    Medication Notes:    Additionally takes tylenol #4, up to three per day.     AED Medication Compliance:  compliant all of the time  Using a pill box:  meds delivered by [employer]    Review of Systems:  No vomiting, diarrhea, fevers, hematuria or kidney stones.  Have you experienced a traumatic fall since your last visit: NO  Are these falls related to your seizures:  Not Applicable    Other Issues:  Reports mild sedation.  Denies TIA type symptoms including amaurosis, focal weakness or numbness, speech deficit or diplopia.  Reports chronic stump pain right greater than left. Denies phantom limb pain.  Is patient safe to drive:  No    Woman Care:   Patient is:  Postmenopausal.    Exam:    /61   Pulse 67   Temp 96.8  F (36  C) (Temporal)      Wt Readings from Last 5 Encounters:   12/03/11 147 lb 0.2 oz (66.7 kg)   10/08/09 159 lb (72.1 kg)   10/17/06 135 lb (61.2 kg)   08/29/06 135 lb (61.2 kg)   06/22/06 135 lb (61.2 kg)     Heart exam without murmur. RRR. No carotid bruit.   No irritability, she continues pleasant, interactive, cooperative; much less bitter than several years ago. Sedated appearing at times, brightens up at others. Language fluent, follows commands, appropriate. States date correctly. Oriented to day, month, year. Short term memory 3/3 verbal objects after distractor. \"World\" spelled backward: radha. Able to state 9 animals in 30 seconds. She has difficulty drawing cube and gives up after a brief try; will not try drawing intersecting pentagons.  In wheel chair with bilateral leg amputations so cannot " evaluate gait. Visual fields full to confrontation. Extraocular movements intact without nystagmus. Smile symmetrical. Facial sensation equal. Tongue midline and strong. No drift, pronation, or tremor. Reflexes difficult to obtain in UE, AKA left, BKA right. Finger finger nose is done well. Minimal resting tremor does not appear parkinsonian.    1/7/23 lacosamide = 7.8, carbamazepine = 7.7, PB = 14, Na = 136.     Assessment and Plan:   1) Focal epilepsy; focal seizures, left temporal onset. Seizures appear to have improved significantly following addition of carbamazepine and are probably completely controlled. Not clear whether minor episodes reported are seizures or not; previously consistently responded to stimulation so we were reassured, now more frequent and not examined so situation is less clear. Could benefit from better evaluation. Not clear she requires lacosamide. Using a fair amount of codeine, sedation could be contributing.  2) Severe vitamin D deficiency; she has consented to vitamin D supplementation.  3) History of significant peripheral vascular disease. Status post left CEA. No evidence of TIA, CAD.  4) Mood continues improved compared to several years past, perhaps because less phenobarbital or because of more aggressive psychotropic treatment.      PLAN:  1) Again encouraged examination; provided specific testing plan in AVS, urged staff to write down results of evaluation; urged patient to comply with evaluation.  2) 24 hour ambulatory EEG.  3) Same phenobarbital, carbamazepine and lacosamide for now. Rx approved for 6 to 12 months as possible. We had previously suggested tapering of lacosamide; she was having frequent seizures while taking this medication and it is not clear it has helped her. She did not want to make any changes in antiseizure medications today.  4) Maximizing carbamazepine or lacosamide; substituting OXC for carbamazepine or LTG for PB could be considered if clinically  indicated. If seizures stay controlled we can cosider tapering either PB or lacosamide in the future provided she agrees.  5) RTC 9 months for in person visit. Call if any seizures. Reviewed symptoms of TIA and asked to present to ED if these occur.  6) If seizures worsen, consider reimaging especially given persistent left temporal slowing and epileptiform activity.  7) AED levels to confirm compliance, rule out toxicity. Sodium to exclude side effects. Patient wanted done at local clinic; printed out lab slips and signed; provided instructions on how to communicate results to us in AVS.        Total time in person today 25 min. Additional 6 min reviewing chart prior to visit. Additional 9 min generating note and coordinating care following visit. So total of 40 min, all on day of visit. Reviewed four individual labs as above. Discussed with caregivers who provided additional unique information.         Again, thank you for allowing me to participate in the care of your patient.      Sincerely,    Karthik Curtis MD

## 2023-10-17 NOTE — PATIENT INSTRUCTIONS
Hello! You were seen for follow up of your seizures.     We would recommend getting an ambulatory overnight EEG- you would have to come here, we would put leads on you and you could go back home and sleep. Then the next day you can come over to have it removed.     We also recommend the aide to perform the following exam when the patient has one of the staring episodes:  Ask her to follow simple commands- like point to the ceiling, or show me two fingers with your left hand. It is okay to be loud if she does not respond to it the first time.   Ask her to remember things- like 12 purple balloons. Have her repeat it afterwards.  Write down the date and time of the event and how long it lasted.    Lab orders for your the blood tests needed are included with this after visit summary. Please present these to your local clinic to get the blood work drawn.    Your local clinic is not in the [facility] system and so will not communicate these results to us. We will often not have access to these results digitally either. To make sure we have access to the results you need to do two things:    1) Call us after the blood work is done.  2) Tell us date blood work was done, location of the clinic, and the telephone number of the clinic.    We can then make the appropriate calls and try to track the results down.

## 2023-11-08 DIAGNOSIS — G40.209 LOCALIZATION-RELATED PARTIAL EPILEPSY WITH COMPLEX PARTIAL SEIZURES (H): ICD-10-CM

## 2023-11-13 RX ORDER — ACETAMINOPHEN 160 MG
TABLET,DISINTEGRATING ORAL
Qty: 100 CAPSULE | OUTPATIENT
Start: 2023-11-13

## 2024-01-31 DIAGNOSIS — G40.209 LOCALIZATION-RELATED PARTIAL EPILEPSY WITH COMPLEX PARTIAL SEIZURES (H): ICD-10-CM

## 2024-02-04 RX ORDER — CARBAMAZEPINE 200 MG/1
TABLET, EXTENDED RELEASE ORAL
Qty: 124 TABLET | Refills: 11 | Status: SHIPPED | OUTPATIENT
Start: 2024-02-04

## 2024-02-04 NOTE — TELEPHONE ENCOUNTER
CARBAMAZEPIN 200MG ER   Last Written Prescription Date:  1/17/2023  Last Fill Quantity: 124,   # refills: 11  Last Office Visit : 10/17/2023  Future Office visit:  7/17/2024  124 Tabs, 11 refills sent to pharm for Pt care.     Brie Barakat RN  Central Triage Red Flags/Med Refills

## 2024-04-25 DIAGNOSIS — G40.109 SYMPTOMATIC LOCALIZATION-RELATED EPILEPSY (H): ICD-10-CM

## 2024-04-25 DIAGNOSIS — G40.209 LOCALIZATION-RELATED PARTIAL EPILEPSY WITH COMPLEX PARTIAL SEIZURES (H): ICD-10-CM

## 2024-04-25 RX ORDER — PHENOBARBITAL 64.8 MG/1
64.8 TABLET ORAL AT BEDTIME
Qty: 31 TABLET | Refills: 4 | Status: SHIPPED | OUTPATIENT
Start: 2024-04-25 | End: 2024-09-12

## 2024-04-25 NOTE — TELEPHONE ENCOUNTER
PHENobarbital (LUMINAL) 64.8 MG tablet 31 tablet 5 10/17/2023     Last Office Visit: 10/17/23  Future Office visit:   7/17/24    Routing refill request to provider for review/approval because:  Drug not on the [facility], [facility] or [facility] refill protocol or controlled substance    Valarie Dhillon RN  [facility] Red Flag Triage/MRT

## 2024-05-22 DIAGNOSIS — G40.209 LOCALIZATION-RELATED PARTIAL EPILEPSY WITH COMPLEX PARTIAL SEIZURES (H): ICD-10-CM

## 2024-05-22 DIAGNOSIS — G40.109 SYMPTOMATIC LOCALIZATION-RELATED EPILEPSY (H): ICD-10-CM

## 2024-05-22 RX ORDER — LACOSAMIDE 100 MG/1
100 TABLET ORAL EVERY MORNING
Qty: 31 TABLET | Refills: 4 | Status: SHIPPED | OUTPATIENT
Start: 2024-05-22

## 2024-05-22 RX ORDER — LACOSAMIDE 150 MG/1
150 TABLET ORAL AT BEDTIME
Qty: 31 TABLET | Refills: 4 | Status: SHIPPED | OUTPATIENT
Start: 2024-05-22

## 2024-05-22 NOTE — TELEPHONE ENCOUNTER
LACOSAMIDE 100MG  and      LACOSAMIDE 150MG   Last Written Prescription Date:  10-17-23  Last Fill Quantity: 31,   # refills: 5  Last Office Visit : 10-17-23  Future Office visit:  7-17-24    Routing refill request to provider for review/approval because:  Drug not on the [facility], [facility] or [facility] refill protocol or controlled substance

## 2024-09-11 DIAGNOSIS — G40.109 SYMPTOMATIC LOCALIZATION-RELATED EPILEPSY (H): ICD-10-CM

## 2024-09-11 DIAGNOSIS — G40.209 LOCALIZATION-RELATED PARTIAL EPILEPSY WITH COMPLEX PARTIAL SEIZURES (H): ICD-10-CM

## 2024-09-12 RX ORDER — PHENOBARBITAL 64.8 MG/1
64.8 TABLET ORAL AT BEDTIME
Qty: 31 TABLET | Refills: 5 | Status: SHIPPED | OUTPATIENT
Start: 2024-09-12

## 2024-09-12 NOTE — TELEPHONE ENCOUNTER
PHENOBARB 64.8MG       Last Written Prescription Date:  4-25-24  Last Fill Quantity: 31,   # refills: 4  Last Office Visit : 10-17-23  ( RTC 9 M)  Future Office visit:  none    Routing refill request to provider for review/approval because:  Drug not on the [facility], [facility] or [facility] refill protocol or controlled substance    Scheduling has been notified to contact the pt for appointment.   
yes

## 2024-10-09 DIAGNOSIS — G40.209 LOCALIZATION-RELATED PARTIAL EPILEPSY WITH COMPLEX PARTIAL SEIZURES (H): ICD-10-CM

## 2024-10-09 DIAGNOSIS — G40.109 SYMPTOMATIC LOCALIZATION-RELATED EPILEPSY (H): ICD-10-CM

## 2024-10-11 RX ORDER — LACOSAMIDE 150 MG/1
150 TABLET ORAL AT BEDTIME
Qty: 31 TABLET | Refills: 3 | Status: SHIPPED | OUTPATIENT
Start: 2024-10-11

## 2024-10-11 RX ORDER — LACOSAMIDE 100 MG/1
100 TABLET ORAL EVERY MORNING
Qty: 31 TABLET | Refills: 3 | Status: SHIPPED | OUTPATIENT
Start: 2024-10-11

## 2024-10-11 NOTE — TELEPHONE ENCOUNTER
LACOSAMIDE 150MG       LACOSAMIDE 100MG   Last Written Prescription Date:  5-22-24  Last Fill Quantity: 31,   # refills: 4  Last Office Visit : 10-17-23  Future Office visit:  1-24-25    Routing refill request to provider for review/approval because:  Drug not on the [facility], [facility] or [facility] refill protocol or controlled substance

## 2025-02-26 DIAGNOSIS — G40.209 LOCALIZATION-RELATED PARTIAL EPILEPSY WITH COMPLEX PARTIAL SEIZURES (H): ICD-10-CM

## 2025-02-26 DIAGNOSIS — G40.109 SYMPTOMATIC LOCALIZATION-RELATED EPILEPSY (H): ICD-10-CM

## 2025-02-28 NOTE — TELEPHONE ENCOUNTER
Disp Refills Start End TRANG   cholecalciferol (VITAMIN D3) 25 mcg (1000 units) capsule 18 capsule 11 11/14/2023 -- No   Sig: Take four capsules once per week.        Disp Refills Start End TRANG   carBAMazepine (TEGRETOL XR) 200 MG 12 hr tablet 124 tablet 11 2/4/2024 -- No   Sig: TAKE 1 TABLET BY MOUTH EVERY DAY IN THE MORNING, 1 TABLET IN THE EVENING & 2 TABLETS AT BEDTIME (TOTAL 800 MG PER DAY)     Aed  1/17/23  Cbc   12/21  Sodium 1/23    lacosamide (VIMPAT) 150 MG TABS tablet 31 tablet 3 10/11/2024 -- No   Sig - Route: TAKE 1 TABLET BY MOUTH EVERYDAY AT BEDTIME - Oral       Karthik Curtis MD  Neurology  Lv 10/17/23 mincep  Nv  6/4/25      Refill decision: Medication unable to be refilled by RN due to: Controlled medication, appt, labs    Request from pharmacy:  Requested Prescriptions   Pending Prescriptions Disp Refills    Cholecalciferol (VITAMIN D3) 50 MCG (2000 UT) CAPS [Pharmacy Med Name: VITAMIN D3 50 MCG CAPSULE] 100 capsule 10     Sig: TAKE 2 CAPSULES BY MOUTH ONCE PER WEEK. (SUN PM)       Vitamin Supplements Protocol Failed - 2/28/2025 10:55 AM        Failed - Medication is active on med list and the sig matches. RN to manually verify dose and sig if red X/fail.     If the protocol passes (green check), you do not need to verify med dose and sig.    A prescription matches if they are the same clinical intention.    For Example: once daily and every morning are the same.    For all fails (red x), verify dose and sig.    If the refill does match what is on file, the RN can still proceed to approve the refill request.     If they do not match, route to the appropriate provider.             Failed - Medication indicated for associated diagnosis     The medication is prescribed for one or more of the following conditions:     Vitamin deficiency   Osteopenia   Osteoporosis   Nutrition counseling   Nutrition education   Feeding ability finding   Bone density finding   Morbid Obesity   History of  bypass of stomach   Idiopathic peripheral neuropathy   General examination of patient   Vitamin D deficiency   Folate deficiency anemia due to dietary causes   Sleeve resection of stomach   Rheumatoid factor level - finding   Crohn's disease   Psoriatic arthritis   Transplant of Kidney   Arthropathy   Alcoholism   Malabsorption syndrome   Disorder of bone and articular cartilage   Cystic Fibrosis  Bronchiectasis            Failed - Recent (12 mo) or future (90 days) visit within the authorizing provider's specialty     The patient must have completed an in-person or virtual visit within the past 12 months or has a future visit scheduled within the next 90 days with the authorizing provider s specialty.  Urgent care and e-visits do not qualify as an office visit for this protocol.          Passed - The patient does not have an order for high-dose vitamin D        Passed - The patient is 2 years of age or older          carBAMazepine (TEGRETOL XR) 200 MG 12 hr tablet [Pharmacy Med Name: CARBAMAZEPINE  MG TABLET] 124 tablet 10     Sig: TAKE 1 TABLET BY MOUTH EVERY DAY IN THE MORNING, 1 TABLET IN THE EVENING & 2 TABLETS AT BEDTIME (TOTAL 800 MG PER DAY)       Anti-Seizure Meds Protocol  Failed - 2/28/2025 10:55 AM        Failed - Review Authorizing provider's last note.      Refer to last progress notes: confirm request is for original authorizing provider (cannot be through other providers).          Failed - Normal ALT or AST on file in past 26 months     Recent Labs   Lab Test 09/04/17  1028   ALT 21     Recent Labs   Lab Test 09/04/17  1028   AST 40             Failed - Has GFR on file in past 12 months and most recent value is normal        Failed - Recent (12 mo) or future (90 days) visit within the authorizing provider's specialty     The patient must have completed an in-person or virtual visit within the past 12 months or has a future visit scheduled within the next 90 days with the authorizing provider s  specialty.  Urgent care and e-visits do not qualify as an office visit for this protocol.          Passed - Carbamazepine level within therapeutic range in last 26 months     Recent Labs   Lab Test 06/21/18  1305   TEGRETOL 8.4  8.4       Carbamazepine level must be checked 2-4 weeks after dosage change.            Passed - Medication is active on med list and the sig matches. RN to manually verify dose and sig if red X/fail.     If the protocol passes (green check), you do not need to verify med dose and sig.    A prescription matches if they are the same clinical intention.    For Example: once daily and every morning are the same.    For all fails (red x), verify dose and sig.    If the refill does match what is on file, the RN can still proceed to approve the refill request.     If they do not match, route to the appropriate provider.             Passed - Medication indicated for associated diagnosis     Medication is associated with one or more of the following diagnoses:     Bipolar   Dementia   Depression   Epilepsy   Migraine   Seizure   Trigeminal Neuralgia   Cyclothymia          Passed - No active pregnancy on record        Passed - No positive pregnancy test in last 12 months          lacosamide (VIMPAT) 150 MG TABS tablet [Pharmacy Med Name: LACOSAMIDE 150 MG TABLET] 31 tablet 2     Sig: TAKE 1 TABLET BY MOUTH EVERYDAY AT BEDTIME       There is no refill protocol information for this order

## 2025-03-02 RX ORDER — ACETAMINOPHEN 160 MG
TABLET,DISINTEGRATING ORAL
Qty: 100 CAPSULE | Refills: 10 | Status: SHIPPED | OUTPATIENT
Start: 2025-03-02

## 2025-03-02 RX ORDER — LACOSAMIDE 150 MG/1
150 TABLET ORAL AT BEDTIME
Qty: 31 TABLET | Refills: 2 | Status: SHIPPED | OUTPATIENT
Start: 2025-03-02

## 2025-03-02 RX ORDER — CARBAMAZEPINE 200 MG/1
TABLET, EXTENDED RELEASE ORAL
Qty: 124 TABLET | Refills: 2 | Status: SHIPPED | OUTPATIENT
Start: 2025-03-02

## 2025-04-10 ENCOUNTER — TELEPHONE (OUTPATIENT)
Dept: NEUROLOGY | Facility: CLINIC | Age: 70
End: 2025-04-10

## 2025-05-22 DIAGNOSIS — G40.109 SYMPTOMATIC LOCALIZATION-RELATED EPILEPSY (H): ICD-10-CM

## 2025-05-22 DIAGNOSIS — G40.209 LOCALIZATION-RELATED PARTIAL EPILEPSY WITH COMPLEX PARTIAL SEIZURES (H): ICD-10-CM

## 2025-05-27 RX ORDER — LACOSAMIDE 150 MG/1
150 TABLET ORAL AT BEDTIME
Qty: 31 TABLET | Refills: 2 | Status: SHIPPED | OUTPATIENT
Start: 2025-05-27

## 2025-05-27 RX ORDER — CARBAMAZEPINE 200 MG/1
TABLET, EXTENDED RELEASE ORAL
Qty: 124 TABLET | Refills: 2 | Status: SHIPPED | OUTPATIENT
Start: 2025-05-27

## 2025-05-27 NOTE — TELEPHONE ENCOUNTER
CARBAMAZEPINE  MG TABLET   Last Written Prescription:  3/2/25  #124, 2 refills  ----------------------  Last Visit Date: 10/17/23  Future Visit Date: 7/29/25  ---------------  Refill decision: Medication unable to be refilled by RN due to: Overdue labs/test:      Request from pharmacy:  Requested Prescriptions   Pending Prescriptions Disp Refills    carBAMazepine (TEGRETOL XR) 200 MG 12 hr tablet [Pharmacy Med Name: CARBAMAZEPINE  MG TABLET] 124 tablet 1     Sig: TAKE 1 TABLET BY MOUTH EVERY DAY IN THE MORNING, 1 TABLET IN THE EVENING & 2 TABLETS AT BEDTIME (TOTAL 800 MG PER DAY)       Anti-Seizure Meds Protocol  Failed - 5/27/2025  7:02 AM        Failed - Review Authorizing provider's last note.      Refer to last progress notes: confirm request is for original authorizing provider (cannot be through other providers).          Failed - Normal ALT or AST on file in past 26 months     Recent Labs   Lab Test 09/04/17  1028   ALT 21     Recent Labs   Lab Test 09/04/17  1028   AST 40             Failed - Carbamazepine level within therapeutic range in last 26 months     Recent Labs   Lab Test 06/21/18  1305   TEGRETOL 8.4  8.4       Carbamazepine level must be checked 2-4 weeks after dosage change.            Failed - Has GFR on file in past 12 months and most recent value is normal        Failed - Recent (12 month) or future (90 days) visit with authorizing provider's specialty (provided they have been seen in the past 15 months)     The patient must have completed an in-person or virtual visit within the past 12 months or has a future visit scheduled within the next 90 days with the authorizing provider s specialty.  Urgent care and e-visits do not qualify as an office visit for this protocol.                       Passed - Medication indicated for associated diagnosis     Medication is associated with one or more of the following diagnoses:      Bipolar   Dementia   Depression   Epilepsy   Migraine   Seizure   Trigeminal Neuralgia   Cyclothymia          Passed - No active pregnancy on record        Passed - No positive pregnancy test in last 12 months      LACOSAMIDE 150 MG TABLET      lacosamide (VIMPAT) 150 MG TABS tablet [Pharmacy Med Name: LACOSAMIDE 150 MG TABLET] 31 tablet 1     Sig: TAKE 1 TABLET BY MOUTH EVERYDAY AT BEDTIME       Rx Protocol Controlled Substance Failed - 5/27/2025  7:02 AM        Failed - Visit with relevant provider in past 3 months or upcoming 3 months (provided they have been seen in the last 6 months)        Failed - Urine drug screeen results on file in past 12 months     [unfilled]           Failed - Controlled Substance Agreement on file in last 12 months     Please review last Controlled Substance Pain agreement document.   CSA -- Encounter Level:    CSA: None found at the encounter level.       CSA -- Patient Level:    CSA: None found at the patient level.               Failed - Auto Fail - Please forward to Provider        Passed - Medication is active on med list and the sig matches        Passed - Medication not refilled in past 28 days     Invalid Medication Grouper          Passed - No active pregnancy on record        Passed - No pregnancy test in past 12 months or most recent test was negative

## 2025-06-19 DIAGNOSIS — G40.209 LOCALIZATION-RELATED PARTIAL EPILEPSY WITH COMPLEX PARTIAL SEIZURES (H): ICD-10-CM

## 2025-06-19 DIAGNOSIS — G40.109 SYMPTOMATIC LOCALIZATION-RELATED EPILEPSY (H): ICD-10-CM

## 2025-06-21 NOTE — TELEPHONE ENCOUNTER
Last Written Prescription:  3/28/25   31 : 2    Last Visit Date: 10/17/23  Future Visit Date: 7/29/25  RTC  9 MOS   JULY 2024  ----------------------    Refill decision: Medication unable to be refilled by RN due to: Controlled medication      Request from pharmacy:  Requested Prescriptions   Pending Prescriptions Disp Refills    PHENobarbital 64.8 MG tablet [Pharmacy Med Name: PHENOBARBITAL 64.800 MG TABLET] 31 tablet 1     Sig: TAKE 1 TABLET BY MOUTH AT BEDTIME       Rx Protocol Controlled Substance Failed - 6/20/2025  7:37 PM        Failed - Visit with relevant provider in past 3 months or upcoming 3 months (provided they have been seen in the last 6 months)        Failed - Urine drug screeen results on file in past 12 months     [unfilled]           Failed - Controlled Substance Agreement on file in last 12 months     Please review last Controlled Substance Pain agreement document.   CSA -- Encounter Level:    CSA: None found at the encounter level.       CSA -- Patient Level:    CSA: None found at the patient level.               Failed - Auto Fail - Please forward to Provider        Passed - Medication is active on med list and the sig matches        Passed - Medication not refilled in past 28 days     Invalid Medication Grouper          Passed - No active pregnancy on record        Passed - No pregnancy test in past 12 months or most recent test was negative

## 2025-06-23 ENCOUNTER — TELEPHONE (OUTPATIENT)
Dept: NEUROLOGY | Facility: CLINIC | Age: 70
End: 2025-06-23

## 2025-06-23 NOTE — TELEPHONE ENCOUNTER
Pharmacy would like a return call back to check on status Phenobarbital. They can be reached directly at [phone].

## 2025-06-24 RX ORDER — PHENOBARBITAL 64.8 MG/1
64.8 TABLET ORAL AT BEDTIME
Qty: 31 TABLET | Refills: 1 | Status: SHIPPED | OUTPATIENT
Start: 2025-06-24

## 2025-07-17 DIAGNOSIS — G40.209 LOCALIZATION-RELATED PARTIAL EPILEPSY WITH COMPLEX PARTIAL SEIZURES (H): ICD-10-CM

## 2025-07-17 DIAGNOSIS — G40.109 SYMPTOMATIC LOCALIZATION-RELATED EPILEPSY (H): ICD-10-CM

## 2025-07-18 NOTE — TELEPHONE ENCOUNTER
Medication Requested:   Disp Refills Start End TRANG   Lacosamide (VIMPAT) 100 MG TABS tablet 31 tablet 4 1/31/2025 -- No   Sig - Route: TAKE 1 TABLET BY MOUTH EVERY MORNING - Oral           ----------------------  Last Office Visit : 10/17/2023   [facility]      Future Office visit:     7/29/2025 11:00 AM (30 min)  Regi   Arrive by: 10:45 AM   RETURN SEIZURE   [facility] ([facility])   Karthik Curtis MD     ----------------------        Refill decision: Refill pended and routed to the provider for review/determination due to the following criteria not met:     Medication not on [facility], [facility], [facility] refill protocol / Controlled Medication Request

## 2025-07-18 NOTE — TELEPHONE ENCOUNTER
Last seen: 10/17/23  RTC: 9 months  Cancel: yes  No-show: yes  Next appt: 7/29/25    Incoming refill from refill team    Medication requested: Lacosamide (VIMPAT) 100 MG TABS tablet   Directions: TAKE 1 TABLET BY MOUTH EVERY MORNING   Qty: 31 + 2  Last refilled: 1/21/25    Medication refill pended and sent to covering provider for review.

## 2025-07-19 RX ORDER — LACOSAMIDE 100 MG/1
100 TABLET ORAL EVERY MORNING
Qty: 31 TABLET | Refills: 5 | Status: SHIPPED | OUTPATIENT
Start: 2025-07-19

## 2025-07-29 ENCOUNTER — OFFICE VISIT (OUTPATIENT)
Dept: NEUROLOGY | Facility: CLINIC | Age: 70
End: 2025-07-29
Payer: COMMERCIAL

## 2025-07-29 VITALS
OXYGEN SATURATION: 92 % | DIASTOLIC BLOOD PRESSURE: 57 MMHG | HEART RATE: 68 BPM | TEMPERATURE: 97.3 F | SYSTOLIC BLOOD PRESSURE: 107 MMHG | HEIGHT: 58 IN

## 2025-07-29 DIAGNOSIS — G40.209 LOCALIZATION-RELATED PARTIAL EPILEPSY WITH COMPLEX PARTIAL SEIZURES (H): ICD-10-CM

## 2025-07-29 DIAGNOSIS — G40.109 SYMPTOMATIC LOCALIZATION-RELATED EPILEPSY (H): Primary | ICD-10-CM

## 2025-07-29 RX ORDER — LACOSAMIDE 150 MG/1
150 TABLET ORAL AT BEDTIME
Qty: 31 TABLET | Refills: 5 | Status: SHIPPED | OUTPATIENT
Start: 2025-07-29

## 2025-07-29 RX ORDER — CARBAMAZEPINE 200 MG/1
TABLET, EXTENDED RELEASE ORAL
Qty: 124 TABLET | Refills: 11 | Status: SHIPPED | OUTPATIENT
Start: 2025-07-29

## 2025-07-29 RX ORDER — PHENOBARBITAL 64.8 MG/1
64.8 TABLET ORAL AT BEDTIME
Qty: 31 TABLET | Refills: 5 | Status: SHIPPED | OUTPATIENT
Start: 2025-07-29

## 2025-07-29 RX ORDER — LACOSAMIDE 100 MG/1
100 TABLET ORAL EVERY MORNING
Qty: 31 TABLET | Refills: 5 | Status: SHIPPED | OUTPATIENT
Start: 2025-07-29

## 2025-07-29 NOTE — PATIENT INSTRUCTIONS
Lab orders for your the blood tests needed are included with this after visit summary. Please present these to your local clinic to get the blood work drawn.    Your local clinic is not in the [employer] system and so will not communicate these results to us. We will often not have access to these results digitally either. To make sure we have access to the results you need to do two things:    1) Call us after the blood work is done. Our number is [phone]  2) Tell us date blood work was done, location of the clinic, and the telephone number of the clinic.    We can then make the appropriate calls and try to track the results down.

## 2025-07-29 NOTE — PROGRESS NOTES
"[facility] Progress Note      Patient:  Zahra Benjamin  :  1955   Age:  70 year old   Today's Office Visit:  2025    Epilepsy Data:    Seizure Record  Current Visit Date: 25  Previous Visit Date: 10/17/23  Months since last visit: 21.39  Seizure Type 1: Simple partial onset followed by impairment of consciousness  Description of Sz Type 1: stare followed by unilateral automatisms and stiffness  # of Type 1 Seizure since last visit: 140  Freq. Type 1 / Month: 6.55  Seizure Type 2: Tonic-clonic seizures  Description of Sz Type 2: convulsions  # of Type 2 Seizure since last visit: 0  Freq. Type 2 / Month: 0  Seizure Type 3: Unspecified Staring Spell  Description of Sz Type 3: staring, not examined, unclear if responsive    Background History:    Left temporal CPS. Well controlled w /d, /d, CBZ 1000/d while followed at [facility] between  and . She refused simplification of regimen. 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 AEDs changed to PB 60/d and /d w recurrent sz. Referred here where Vimpat increased to 350/d w persistent seizures. Initially refused another AED but relented after ambulatory EEG found daily seizures. We restarted carbamazepine increasing to 800 mg with improvement. Has refused tapering of PB. Sig vit D deficiency, refused supplementation.    History of Present Illness:     No convulsions.  Continues with lesser spells that sound like seizures. Caregiver describes her looking to right with bilateral arm elevation and some arm jerking but lasts only 15 seconds.  Quick recovery with speech after seizure. \"I'm OK\". Staff estimates occur once or twice per week. Patient is vague when asked if she is aware of seizure or if there is any warning.    Patient denies lesser spells suggesting TIA.    We had urged AED levels last visit; patient insisted that these be done in local " system but we cannot find results and patient vague as to whether they were obtained.    Current Outpatient Medications   Medication Sig Dispense Refill    acetaminophen (TYLENOL) 500 MG tablet Take 2 tablets by mouth every 8 hours. 100 tablet     acetaminophen-codeine (TYLENOL #4) 300-60 MG per tablet Take 1 tablet by mouth      calcium carbonate (TUMS) 500 MG chewable tablet Take 750 mg by mouth 2 times daily      carBAMazepine (TEGRETOL XR) 200 MG 12 hr tablet TAKE 1 TABLET BY MOUTH EVERY DAY IN THE MORNING, 1 TABLET IN THE EVENING & 2 TABLETS AT BEDTIME (TOTAL 800 MG PER DAY) 124 tablet 2    Cholecalciferol (VITAMIN D3) 50 MCG (2000 UT) CAPS TAKE 2 CAPSULES BY MOUTH ONCE PER WEEK. (SUN PM) 100 capsule 10    Cyanocobalamin (VITAMIN B-12) 2500 MCG tablet Place 2,500 mcg under the tongue daily.      docusate sodium (COLACE) 100 MG capsule Take 100 mg by mouth 2 times daily.      DULoxetine (CYMBALTA) 20 MG capsule Take 20 mg by mouth daily      Ferrous Sulfate Dried (SLOW IRON PO) Take 50 mg by mouth daily      Lacosamide (VIMPAT) 100 MG TABS tablet TAKE 1 TABLET BY MOUTH EVERY MORNING 31 tablet 5    lacosamide (VIMPAT) 150 MG TABS tablet Take 1 tablet (150 mg) by mouth at bedtime. 31 tablet 2    magnesium oxide (MAG-OX) 400 MG tablet Take 400 mg by mouth At Bedtime.      multivitamin, therapeutic with minerals (THERA-VIT-M) TABS Take 1 tablet by mouth daily.      olanzapine (ZYPREXA) 2.5 MG tablet Take 0.5 tablets by mouth 2 times daily. 30 tablet 0    PANtoprazole (PROTONIX) 40 MG enteric coated tablet Take 1 tablet by mouth 2 times daily. 60 tablet 0    PHENobarbital 64.8 MG tablet TAKE 1 TABLET BY MOUTH AT BEDTIME 31 tablet 1    ROSUVASTATIN CALCIUM PO Take 10 mg by mouth daily      CALCIUM 600 + D 600-200 MG-UNIT OR TABS 2 times daily (Patient not taking: Reported on 7/29/2025)  3    calcium carbonate 750 MG CHEW Take 1,500 mg by mouth daily. (Patient not taking: Reported on 7/29/2025)      Ferrous  "Gluconate-C-Folic Acid (IRON-C PO) Take 50 mg by mouth daily (Patient not taking: Reported on 4/19/2022)      hydrOXYzine (VISTARIL) 25 MG capsule Take 25 mg by mouth 4 times daily as needed  (Patient not taking: Reported on 7/29/2025)      NONFORMULARY CBD Oil (Patient not taking: Reported on 7/29/2025)          Perceived AED Side Effects:  No    Medication Notes:    AEDs from blister pack caregiver brought with.    AED Medication Compliance:  compliant all of the time  Using a pill box:  uses blister pack    Review of Systems:  No vomiting, diarrhea, fevers, hematuria or kidney stones.  Have you experienced a traumatic fall since your last visit: NO  Are these falls related to your seizures:  Not Applicable      Other Issues:    No other health troubles. Lives at home with 24 hour caregivers.  Is patient safe to drive:  No    Woman Care:   Patient is:  postmenopausal.    Exam:    /57   Pulse 68   Temp 97.3  F (36.3  C) (Temporal)   Ht 4' 9.99\" (147.3 cm)   SpO2 92%      Wt Readings from Last 5 Encounters:   12/03/11 147 lb 0.2 oz (66.7 kg)   10/08/09 159 lb (72.1 kg)   10/17/06 135 lb (61.2 kg)   08/29/06 135 lb (61.2 kg)   06/22/06 135 lb (61.2 kg)     States date correctly. Oriented to day, month, year. Short term memory 2/3 verbal objects after distractor; initially reports \"world\" is one of the three items, unable to get third item from list. \"World\" spelled backward: dlorw. Able to state 10 animals in 30 seconds with one repetition. Language is fluent. In wheel chair. VFF. EOMI. No nystagmus. Mildly disrupted smooth pursuit. Smile symmetrical. Tongue midline. NO drift, pronation, or tremor. FFN is done well.    Assessment and Plan:   1) Focal epilepsy; focal seizures, left temporal onset. No convulsive seizures. More precise descriptions suggest that seizures have recurred and are reasonably frequent tho this is has not been formally demonstrated.   2) Severe vitamin D deficiency; she has consented " to vitamin D supplementation.  3) History of significant peripheral vascular disease. Status post left CEA. No evidence of TIA, CAD.  4) Mood continues improved compared to several years past, perhaps because less phenobarbital or because of more aggressive psychotropic treatment.    DISCUSSION  Above discussed frankly and supportively. Expressed concerns that she is having frequent focal impaired seizures. While she does not find these terribly disabling I am concerned that seizures at this frequency may cause further cognitive impairment over the long term. We discussed further evaluation and treatment. Patient vigorously resisted ambulatory EEG monitoring or even 3 hour EEG. We discussed AED levels and she would only allow these to be done locally. We pointed out difficulties we had with obtaining local results but she persisted in insisting labs be done locally. We discussed further increasing AEDs or changing to other medications but she was very dubious about this feeling that seizures that were not convulsive and not disabling should not necessarily be treated.    PLAN:  1) Same phenobarbital, carbamazepine and lacosamide for now. Rx approved for 6 to 12 months as possible. We had previously suggested tapering of lacosamide; she was having frequent seizures while taking this medication and it is not clear it has helped her. She had resisted that at the time and again was very dubious about changing AEDs today.  2) AED levels. She insists on doing so locally. Given lab requests. Provided instructions on communicating results in AVS.  3) Maximizing carbamazepine or lacosamide; substituting OXC for carbamazepine or LTG for PB could be considered if clinically indicated. If seizures stay controlled we can cosider tapering either PB or lacosamide in the future provided she agrees.  4) Will continue urging further EEG evaluation in the future.  5) RTC 6 months for in person visit. Call if any seizures. Reviewed  symptoms of TIA and asked to present to ED if these occur.  6) If seizures worsen, consider reimaging especially given persistent left temporal slowing and epileptiform activity.     Total time in person today 25 min. Additional 7 min reviewing chart prior to visit. Additional 9 min generating note and coordinating care following visit. So total of 41 min, all on day of visit. Reviewed four individual labs as above. Discussed with caregivers who provided additional unique information. The longitudinal plan of care for this patient's epilepsy (including epilepsy comorbidities if appropriate) was addressed during this visit. Due to the added complexity in care, I will continue to support this patient in the subsequent management of this condition(s) and with the ongoing continuity of care of this condition(s).     Karthik Curtis MD

## 2025-07-29 NOTE — LETTER
2025       RE: Zahra Benjamin  : 1955   MRN: [id number]      Dear Colleague,    Thank you for referring your patient, Zahra Benjamin, to the [facility] EPILEPSY CARE at [facility]. Please see a copy of my visit note below.    [facility] Epilepsy Care Progress Note      Patient:  Zahra Benjamin  :  1955   Age:  70 year old   Today's Office Visit:  2025    Epilepsy Data:    Seizure Record  Current Visit Date: 25  Previous Visit Date: 10/17/23  Months since last visit: 21.39  Seizure Type 1: Simple partial onset followed by impairment of consciousness  Description of Sz Type 1: stare followed by unilateral automatisms and stiffness  # of Type 1 Seizure since last visit: 140  Freq. Type 1 / Month: 6.55  Seizure Type 2: Tonic-clonic seizures  Description of Sz Type 2: convulsions  # of Type 2 Seizure since last visit: 0  Freq. Type 2 / Month: 0  Seizure Type 3: Unspecified Staring Spell  Description of Sz Type 3: staring, not examined, unclear if responsive    Background History:    Left temporal CPS. Well controlled w /d, /d, CBZ 1000/d while followed at [facility] between  and . She refused simplification of regimen. 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 AEDs changed to PB 60/d and /d w recurrent sz. Referred here where Vimpat increased to 350/d w persistent seizures. Initially refused another AED but relented after ambulatory EEG found daily seizures. We restarted carbamazepine increasing to 800 mg with improvement. Has refused tapering of PB. Sig vit D deficiency, refused supplementation.    History of Present Illness:     No convulsions.  Continues with lesser spells that sound like seizures. Caregiver describes her looking to right with bilateral arm elevation and some arm jerking but lasts only 15 seconds.  Quick recovery with speech  "after seizure. \"I'm OK\". Staff estimates occur once or twice per week. Patient is vague when asked if she is aware of seizure or if there is any warning.    Patient denies lesser spells suggesting TIA.    We had urged AED levels last visit; patient insisted that these be done in local system but we cannot find results and patient vague as to whether they were obtained.    Current Outpatient Medications   Medication Sig Dispense Refill     acetaminophen (TYLENOL) 500 MG tablet Take 2 tablets by mouth every 8 hours. 100 tablet      acetaminophen-codeine (TYLENOL #4) 300-60 MG per tablet Take 1 tablet by mouth       calcium carbonate (TUMS) 500 MG chewable tablet Take 750 mg by mouth 2 times daily       carBAMazepine (TEGRETOL XR) 200 MG 12 hr tablet TAKE 1 TABLET BY MOUTH EVERY DAY IN THE MORNING, 1 TABLET IN THE EVENING & 2 TABLETS AT BEDTIME (TOTAL 800 MG PER DAY) 124 tablet 2     Cholecalciferol (VITAMIN D3) 50 MCG (2000 UT) CAPS TAKE 2 CAPSULES BY MOUTH ONCE PER WEEK. (SUN PM) 100 capsule 10     Cyanocobalamin (VITAMIN B-12) 2500 MCG tablet Place 2,500 mcg under the tongue daily.       docusate sodium (COLACE) 100 MG capsule Take 100 mg by mouth 2 times daily.       DULoxetine (CYMBALTA) 20 MG capsule Take 20 mg by mouth daily       Ferrous Sulfate Dried (SLOW IRON PO) Take 50 mg by mouth daily       Lacosamide (VIMPAT) 100 MG TABS tablet TAKE 1 TABLET BY MOUTH EVERY MORNING 31 tablet 5     lacosamide (VIMPAT) 150 MG TABS tablet Take 1 tablet (150 mg) by mouth at bedtime. 31 tablet 2     magnesium oxide (MAG-OX) 400 MG tablet Take 400 mg by mouth At Bedtime.       multivitamin, therapeutic with minerals (THERA-VIT-M) TABS Take 1 tablet by mouth daily.       olanzapine (ZYPREXA) 2.5 MG tablet Take 0.5 tablets by mouth 2 times daily. 30 tablet 0     PANtoprazole (PROTONIX) 40 MG enteric coated tablet Take 1 tablet by mouth 2 times daily. 60 tablet 0     PHENobarbital 64.8 MG tablet TAKE 1 TABLET BY MOUTH AT BEDTIME " "31 tablet 1     ROSUVASTATIN CALCIUM PO Take 10 mg by mouth daily       CALCIUM 600 + D 600-200 MG-UNIT OR TABS 2 times daily (Patient not taking: Reported on 7/29/2025)  3     calcium carbonate 750 MG CHEW Take 1,500 mg by mouth daily. (Patient not taking: Reported on 7/29/2025)       Ferrous Gluconate-C-Folic Acid (IRON-C PO) Take 50 mg by mouth daily (Patient not taking: Reported on 4/19/2022)       hydrOXYzine (VISTARIL) 25 MG capsule Take 25 mg by mouth 4 times daily as needed  (Patient not taking: Reported on 7/29/2025)       NONFORMULARY CBD Oil (Patient not taking: Reported on 7/29/2025)          Perceived AED Side Effects:  No    Medication Notes:    AEDs from blister pack caregiver brought with.    AED Medication Compliance:  compliant all of the time  Using a pill box:  uses blister pack    Review of Systems:  No vomiting, diarrhea, fevers, hematuria or kidney stones.  Have you experienced a traumatic fall since your last visit: NO  Are these falls related to your seizures:  Not Applicable      Other Issues:    No other health troubles. Lives at home with 24 hour caregivers.  Is patient safe to drive:  No    Woman Care:   Patient is:  postmenopausal.    Exam:    /57   Pulse 68   Temp 97.3  F (36.3  C) (Temporal)   Ht 4' 9.99\" (147.3 cm)   SpO2 92%      Wt Readings from Last 5 Encounters:   12/03/11 147 lb 0.2 oz (66.7 kg)   10/08/09 159 lb (72.1 kg)   10/17/06 135 lb (61.2 kg)   08/29/06 135 lb (61.2 kg)   06/22/06 135 lb (61.2 kg)     States date correctly. Oriented to day, month, year. Short term memory 2/3 verbal objects after distractor; initially reports \"world\" is one of the three items, unable to get third item from list. \"World\" spelled backward: dlorw. Able to state 10 animals in 30 seconds with one repetition. Language is fluent. In wheel chair. VFF. EOMI. No nystagmus. Mildly disrupted smooth pursuit. Smile symmetrical. Tongue midline. NO drift, pronation, or tremor. FFN is done " well.    Assessment and Plan:   1) Focal epilepsy; focal seizures, left temporal onset. No convulsive seizures. More precise descriptions suggest that seizures have recurred and are reasonably frequent tho this is has not been formally demonstrated.   2) Severe vitamin D deficiency; she has consented to vitamin D supplementation.  3) History of significant peripheral vascular disease. Status post left CEA. No evidence of TIA, CAD.  4) Mood continues improved compared to several years past, perhaps because less phenobarbital or because of more aggressive psychotropic treatment.    DISCUSSION  Above discussed frankly and supportively. Expressed concerns that she is having frequent focal impaired seizures. While she does not find these terribly disabling I am concerned that seizures at this frequency may cause further cognitive impairment over the long term. We discussed further evaluation and treatment. Patient vigorously resisted ambulatory EEG monitoring or even 3 hour EEG. We discussed AED levels and she would only allow these to be done locally. We pointed out difficulties we had with obtaining local results but she persisted in insisting labs be done locally. We discussed further increasing AEDs or changing to other medications but she was very dubious about this feeling that seizures that were not convulsive and not disabling should not necessarily be treated.    PLAN:  1) Same phenobarbital, carbamazepine and lacosamide for now. Rx approved for 6 to 12 months as possible. We had previously suggested tapering of lacosamide; she was having frequent seizures while taking this medication and it is not clear it has helped her. She had resisted that at the time and again was very dubious about changing AEDs today.  2) AED levels. She insists on doing so locally. Given lab requests. Provided instructions on communicating results in AVS.  3) Maximizing carbamazepine or lacosamide; substituting OXC for carbamazepine or  LTG for PB could be considered if clinically indicated. If seizures stay controlled we can cosider tapering either PB or lacosamide in the future provided she agrees.  4) Will continue urging further EEG evaluation in the future.  5) RTC 6 months for in person visit. Call if any seizures. Reviewed symptoms of TIA and asked to present to ED if these occur.  6) If seizures worsen, consider reimaging especially given persistent left temporal slowing and epileptiform activity.     Total time in person today 25 min. Additional 7 min reviewing chart prior to visit. Additional 9 min generating note and coordinating care following visit. So total of 41 min, all on day of visit. Reviewed four individual labs as above. Discussed with caregivers who provided additional unique information. The longitudinal plan of care for this patient's epilepsy (including epilepsy comorbidities if appropriate) was addressed during this visit. Due to the added complexity in care, I will continue to support this patient in the subsequent management of this condition(s) and with the ongoing continuity of care of this condition(s).     Karthik Curtis MD    Again, thank you for allowing me to participate in the care of your patient.      Sincerely,    Karthik Curtis MD

## 2025-08-01 ENCOUNTER — EXTERNAL ORDER RESULTS (OUTPATIENT)
Dept: LAB | Facility: CLINIC | Age: 70
End: 2025-08-01
Payer: MEDICARE

## 2025-08-01 ENCOUNTER — TELEPHONE (OUTPATIENT)
Dept: NEUROLOGY | Facility: CLINIC | Age: 70
End: 2025-08-01

## 2025-08-01 DIAGNOSIS — G40.109 SYMPTOMATIC LOCALIZATION-RELATED EPILEPSY (H): Primary | ICD-10-CM

## 2025-08-05 LAB
ALBUMIN (EXTERNAL): 3.5 G/DL (ref 3.5–5.2)
ALK PHOSPHATASE (EXTERNAL): 119 U/L (ref 44–147)
ALT SERPL-CCNC: 14 U/L (ref 5–30)
AST SERPL-CCNC: 29 U/L (ref 7–31)
BILIRUB SERPL-MCNC: 0.4 MG/DL (ref 0.3–1.2)
BUN SERPL-MCNC: 16 MG/DL (ref 6–20)
CALCIUM (EXTERNAL): 8.7 MG/DL (ref 8.5–10.2)
CHLORIDE (EXTERNAL): 102.6 MMOL/L (ref 98–107)
CO2 (EXTERNAL): 31.5 MMOL/L (ref 23–29)
CREATININE (EXTERNAL): 0.93 MG/DL (ref 0.6–1.1)
GFR ESTIMATED (EXTERNAL): 59.4 ML/MIN
GLUCOSE (EXTERNAL): 141 MG/DL (ref 70–105)
Lab: 15.1 MG/L (ref 15–40)
Lab: 4.5 MCG/ML
Lab: 5.1 MG/L (ref 4–12)
POTASSIUM (EXTERNAL): 4.27 MMOL/L (ref 3.5–5.1)
PROTEIN TOTAL (EXTERNAL): 7.3 G/DL (ref 6.4–8.3)
SODIUM (EXTERNAL): 141.2 MMOL/L (ref 136–145)

## 2025-08-06 DIAGNOSIS — G40.109 SYMPTOMATIC LOCALIZATION-RELATED EPILEPSY (H): Primary | ICD-10-CM
